# Patient Record
Sex: MALE | Race: WHITE | NOT HISPANIC OR LATINO | Employment: OTHER | URBAN - METROPOLITAN AREA
[De-identification: names, ages, dates, MRNs, and addresses within clinical notes are randomized per-mention and may not be internally consistent; named-entity substitution may affect disease eponyms.]

---

## 2023-09-01 PROBLEM — R00.2 PALPITATIONS: Status: ACTIVE | Noted: 2023-09-01

## 2023-09-01 PROBLEM — I42.9 CARDIOMYOPATHY (MULTI): Status: ACTIVE | Noted: 2023-09-01

## 2023-09-01 PROBLEM — R31.9 BLOOD IN URINE: Status: ACTIVE | Noted: 2023-09-01

## 2023-09-01 PROBLEM — J47.0 BRONCHIECTASIS WITH ACUTE LOWER RESPIRATORY INFECTION (MULTI): Status: ACTIVE | Noted: 2023-09-01
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
## 2023-09-01 PROBLEM — I49.1 ATRIAL PREMATURE COMPLEX: Status: ACTIVE | Noted: 2023-09-01

## 2023-09-01 PROBLEM — D64.9 ANEMIA: Status: ACTIVE | Noted: 2023-09-01

## 2023-09-01 PROBLEM — I49.8 ATRIAL ARRHYTHMIA: Status: ACTIVE | Noted: 2023-09-01

## 2023-09-01 PROBLEM — E03.9 HYPOTHYROIDISM: Status: ACTIVE | Noted: 2023-09-01

## 2023-09-01 PROBLEM — I47.10 SUPRAVENTRICULAR TACHYCARDIA (CMS-HCC): Status: ACTIVE | Noted: 2023-09-01

## 2023-09-01 PROBLEM — R31.0 FRANK HEMATURIA: Status: ACTIVE | Noted: 2023-09-01

## 2023-09-01 PROBLEM — E11.65 HYPERGLYCEMIA DUE TO TYPE 2 DIABETES MELLITUS (MULTI): Status: ACTIVE | Noted: 2023-09-01

## 2023-09-01 PROBLEM — I49.3 VENTRICULAR PREMATURE DEPOLARIZATION: Status: ACTIVE | Noted: 2023-09-01

## 2023-09-01 PROBLEM — I49.9 VENTRICULAR ARRHYTHMIA: Status: ACTIVE | Noted: 2023-09-01

## 2023-09-01 PROBLEM — I47.20 VENTRICULAR TACHYCARDIA (MULTI): Status: ACTIVE | Noted: 2023-09-01

## 2023-09-01 PROBLEM — I49.1 SUPRAVENTRICULAR PREMATURE BEATS: Status: ACTIVE | Noted: 2023-09-01

## 2023-09-01 PROBLEM — R31.9 HEMATURIA SYNDROME: Status: ACTIVE | Noted: 2023-09-01

## 2023-09-01 PROBLEM — R93.89 ABNORMAL CHEST CT: Status: ACTIVE | Noted: 2023-09-01

## 2023-09-01 PROBLEM — I10 HYPERTENSION: Status: ACTIVE | Noted: 2023-09-01

## 2023-09-01 RX ORDER — CHOLECALCIFEROL (VITAMIN D3) 50 MCG
2000 TABLET ORAL DAILY
COMMUNITY

## 2023-09-01 RX ORDER — CARVEDILOL 3.12 MG/1
3.12 TABLET ORAL
COMMUNITY
Start: 2020-03-13 | End: 2023-12-18

## 2023-09-01 RX ORDER — ALBUTEROL SULFATE 0.83 MG/ML
2.5 SOLUTION RESPIRATORY (INHALATION) 2 TIMES DAILY
COMMUNITY
End: 2023-12-04 | Stop reason: SDUPTHER

## 2023-09-01 RX ORDER — LEVOTHYROXINE SODIUM 50 UG/1
TABLET ORAL
COMMUNITY
End: 2023-12-04 | Stop reason: SDUPTHER

## 2023-09-01 RX ORDER — EPINEPHRINE 0.3 MG/.3ML
INJECTION INTRAMUSCULAR
COMMUNITY
End: 2023-10-23 | Stop reason: SDUPTHER

## 2023-09-01 RX ORDER — ASPIRIN 81 MG/1
81 TABLET ORAL DAILY
COMMUNITY
End: 2024-01-30 | Stop reason: ALTCHOICE

## 2023-09-01 RX ORDER — VITAMIN A 3000 MCG
2 CAPSULE ORAL EVERY 2 HOUR PRN
COMMUNITY
Start: 2012-02-20

## 2023-10-21 ENCOUNTER — HOSPITAL ENCOUNTER (EMERGENCY)
Facility: HOSPITAL | Age: 83
Discharge: HOME | End: 2023-10-21
Attending: EMERGENCY MEDICINE
Payer: MEDICARE

## 2023-10-21 VITALS
DIASTOLIC BLOOD PRESSURE: 74 MMHG | RESPIRATION RATE: 16 BRPM | HEART RATE: 91 BPM | HEIGHT: 72 IN | SYSTOLIC BLOOD PRESSURE: 138 MMHG | OXYGEN SATURATION: 98 % | TEMPERATURE: 98.4 F | WEIGHT: 166.45 LBS | BODY MASS INDEX: 22.54 KG/M2

## 2023-10-21 DIAGNOSIS — T78.40XA ALLERGIC REACTION, INITIAL ENCOUNTER: ICD-10-CM

## 2023-10-21 DIAGNOSIS — R22.0 SWELLING OF BOTH LIPS: Primary | ICD-10-CM

## 2023-10-21 PROCEDURE — 2500000001 HC RX 250 WO HCPCS SELF ADMINISTERED DRUGS (ALT 637 FOR MEDICARE OP): Performed by: EMERGENCY MEDICINE

## 2023-10-21 PROCEDURE — 99283 EMERGENCY DEPT VISIT LOW MDM: CPT | Performed by: EMERGENCY MEDICINE

## 2023-10-21 PROCEDURE — 2500000004 HC RX 250 GENERAL PHARMACY W/ HCPCS (ALT 636 FOR OP/ED): Performed by: EMERGENCY MEDICINE

## 2023-10-21 RX ORDER — FAMOTIDINE 20 MG/1
20 TABLET, FILM COATED ORAL ONCE
Status: COMPLETED | OUTPATIENT
Start: 2023-10-21 | End: 2023-10-21

## 2023-10-21 RX ORDER — LORATADINE 10 MG/1
10 TABLET ORAL DAILY
Status: DISCONTINUED | OUTPATIENT
Start: 2023-10-21 | End: 2023-10-21 | Stop reason: HOSPADM

## 2023-10-21 RX ORDER — EPINEPHRINE 0.3 MG/.3ML
1 INJECTION SUBCUTANEOUS ONCE AS NEEDED
Qty: 0.3 ML | Refills: 0 | Status: SHIPPED | OUTPATIENT
Start: 2023-10-21

## 2023-10-21 RX ADMIN — FAMOTIDINE 20 MG: 20 TABLET ORAL at 14:45

## 2023-10-21 RX ADMIN — LORATADINE 10 MG: 10 TABLET ORAL at 14:44

## 2023-10-21 RX ADMIN — DEXAMETHASONE 10 MG: 6 TABLET ORAL at 14:44

## 2023-10-21 ASSESSMENT — PAIN SCALES - GENERAL: PAINLEVEL_OUTOF10: 0 - NO PAIN

## 2023-10-21 ASSESSMENT — PAIN DESCRIPTION - PROGRESSION: CLINICAL_PROGRESSION: NOT CHANGED

## 2023-10-21 ASSESSMENT — COLUMBIA-SUICIDE SEVERITY RATING SCALE - C-SSRS
1. IN THE PAST MONTH, HAVE YOU WISHED YOU WERE DEAD OR WISHED YOU COULD GO TO SLEEP AND NOT WAKE UP?: NO
2. HAVE YOU ACTUALLY HAD ANY THOUGHTS OF KILLING YOURSELF?: NO
6. HAVE YOU EVER DONE ANYTHING, STARTED TO DO ANYTHING, OR PREPARED TO DO ANYTHING TO END YOUR LIFE?: NO

## 2023-10-21 ASSESSMENT — PAIN - FUNCTIONAL ASSESSMENT: PAIN_FUNCTIONAL_ASSESSMENT: 0-10

## 2023-10-21 NOTE — ED PROVIDER NOTES
EMERGENCY DEPARTMENT ENCOUNTER      [ ] CODE STEMI [ ] CODE Neuro [ ] CODE Yellow [ ] Modified Trauma [ ] CODE Blue      CHIEF COMPLAINT      Chief Complaint   Patient presents with    Allergic Reaction     I noticed my lips swelling and numbness  in my lips       Mode of Arrival:Car  Primary Care Provider: Kiran John MD  Medical Record Number: [id number]      History obtained by: Patient  Limited by nothing  Time seen: 3:53 PM    QUALITY MEASURES   PPE Utilized: N95 with goggles and gloves      HPI      Héctor Mariano is a 83 y.o. male with a history of levothyroxine use for hypothyroidism, hypertension and congestive heart failure on Coreg, well managed, not ACE inhibitor or ARB, known history of insect bites that had led to lip swelling in the past and has EpiPen at home, states that he woke up this morning feeling fine but then noticed slow onset of upper and lower lip swelling.  Does not recall insect bite overnight denies family history of hereditary angioedema.  Has not had any fever chills throat swelling or trouble breathing, sore throat, chest pain or swelling elsewhere in the body.  Did not notice a rash.  Denies any pruritus or pain.  Only notices the swelling in the lips.  Did not notice any open sores or wounds in the mouth or on the lips itself.  Did not take any did take Benadryl last night but more so for a rhinorrhea that he had yesterday.  Denies any injury to the area.  States again that he has not taken lisinopril for years stopped taking it about 3 years ago.  Never had this reaction to lisinopril only states that he had this reaction in the past with bugs and insect bites.  Denies any recent travel or new creams, detergents soaps clothing or new foods eaten.  No other complaints.      Patient otherwise denies fever, chills, n/v/d, chest pain, shortness of breath, sore throat, cough, rhinorrhea, abdominal pain, dysuria, hematuria, hematemesis, hematochezia, melena or any other accompanying  symptoms of late.      The patient has no other complaints at this time.               PAST MEDICAL HISTORY    No past medical history on file.      I have personally reviewed the patient's past medical history in the records.  Shayne Leon MD    SURGICAL HISTORY    No past surgical history on file.    I have personally reviewed the patient's past surgical history in the records.  Shayne Leon MD    CURRENT MEDICATIONS    I have reviewed the patient’s medications.   Please see nursing and pharmacy records for the most up to date list.     [unfilled]    ALLERGIES    Allergies   Allergen Reactions    Bee Venom Protein (Honey Bee) Swelling       I have personally reviewed the patient's past history of allergies in the records.  Shayne Leon MD    FAMILY HISTORY    Family History   Problem Relation Name Age of Onset    Heart disease Father      Ovarian cancer Paternal Grandmother      Diabetes Paternal Grandfather      Prostate cancer Paternal Grandfather         I have personally reviewed the patient's family history in the records.  Shayne Leon MD    SOCIAL HISTORY    Social History     Socioeconomic History    Marital status: Unknown     Spouse name: Not on file    Number of children: Not on file    Years of education: Not on file    Highest education level: Not on file   Occupational History    Not on file   Tobacco Use    Smoking status: Not on file    Smokeless tobacco: Not on file   Substance and Sexual Activity    Alcohol use: Not on file    Drug use: Not on file    Sexual activity: Not on file   Other Topics Concern    Not on file   Social History Narrative    Not on file     Social Determinants of Health     Financial Resource Strain: Not on file   Food Insecurity: Not on file   Transportation Needs: Not on file   Physical Activity: Not on file   Stress: Not on file   Social Connections: Not on file   Intimate Partner Violence: Not on file   Housing Stability: Not on file         I have personally reviewed the  patient's social history in the records.  Shayne Leon MD    REVIEW OF SYSTEMS      14 point ROS was reviewed and negative except as noted above in HPI.      PHYSICAL EXAM    VITAL SIGNS:    BP (!) 157/92 (Patient Position: Sitting)   Pulse 99   Temp 36.9 °C (98.4 °F) (Temporal)   Resp 18   Ht 1.829 m (6')   Wt 75.5 kg (166 lb 7.2 oz)   SpO2 99%   BMI 22.57 kg/m²    Review EMR for vital signs  Nursing note and vitals reviewed.    Constitutional:  Alert and oriented, well-developed, well-nourished, appears stated age, non-toxic appearing  HENT:  Normocephalic, atraumatic, bilateral external ears normal, oropharynx moist, Nose normal.  Posterior pharynx within normal limits.  Slight swelling of the upper and lower lip noted confirmed by wife with no open wounds.  No ulceration noted intraorally.  Neck: normal range of motion, no tenderness, supple, no stridor.  Eyes:  PERRL, EOMI, conjunctiva normal, no discharge.   Cardiovascular:  Normal heart rate, normal rhythm, no murmurs, no rubs, no gallops.   Respiratory:  Normal breath sounds, no respiratory distress, no wheezing, no chest wall tenderness.   GI:  Bowel sounds normal, soft, no tenderness, no rebound or guarding, no distention, no masses pulsatile or otherwise   (any female  exam was done with female chaperone present):   Deferred  Integument:  Warm, dry, no erythema, no rash, no edema.   Back:  No midline tenderness, no CVA tenderness.   Musculoskeletal:  Intact distal pulses, no tenderness, no cyanosis, no clubbing, with capillary refill less than 2 seconds. Good range of motion in all major joints. No tenderness to palpation or major deformities noted.    Neurologic:  Alert & oriented x 3, normal motor function, normal sensory function, no focal deficits noted. Cranial nerves II-XII intact.  Psychiatric:  Affect normal, judgment normal, mood normal.     EKG  None           Reviewed and interpreted by me, Shayne Leon MD       RADIOLOGY  No orders to  display       All Imaging studies evaluated and interpreted by ED physician except when noted otherwise.    ED PROVIDER INTERPRETATION (XRAYS ONLY):       *I have interpreted the x-ray real-time in the ED myself, and made a clinical decision on it prior to the formal radiology reading.    Shayne Leon M.D.    RADIOLOGIST IMPRESSION (U/S, CT, MRI):   No orders to display         PERTINENT LABS    Please refer to the chart for all lab work and to MDM for relevant discussion.      PROCEDURE    None (procdoc)    ED COURSE & MEDICAL DECISION MAKING    Pertinent Labs & Imaging studies reviewed. (See chart for details)    MDM:    Assessment: Héctor Mariano is a 83 y.o.male who presents to the ED with possible angioedema or allergic reaction, reviewed [employer] encyclopedia and read that there has been very rare occasions of angioedema from Coreg but my suspicion is very low for this suspect that perhaps patient had an insect bite overnight instead that led to this reaction but does not appear to be an airway compromise with vital signs stable and told patient I would give him Decadron, Claritin and Pepcid and would observe him for the next hour and if there is no worsening or even a slight improvement, will consider discharge with instructions to follow-up with cardiologist regarding heart rate.  Patient understands and agrees with plan        Prior records in EPIC reviewed by me.     2023 Coding Requirements:  --Independent historian(s):    see HPI  --Review of prior records:    EHR reviewed   --Relevant comorbidities:    see records  --Social determinants of health:        I have considered the following diagnoses for this patients allergic reaction  Allergic reaction, angioedema, respiratory distress, pneumonia, PE, asthma, COPD, urticaria, anaphylaxis, PE, carcinoid tumors, alcohol-induced flush, autoimmune disorders, thyroid disorders, epiglottitis, cellulitis, or infectious airway swelling, adverse   meds  reaction.      Both wife and myself noticed visible improvement after about an hour after receiving Decadron Claritin Pepcid and after discussion with patient stating that his last EpiPen was about 8 years old, agreed to a repeat prescription and will obtain over-the-counter Claritin and Pepcid but given strict return precautions if he develops any trouble breathing or throat swelling for which she should take an EpiPen and come back to the ED.  Otherwise will follow-up with primary care doctor for possible referral to allergist.  Patient and wife understand and agree with this plan.          ED VITALS  Vitals:    10/21/23 1428   BP: (!) 157/92   Patient Position: Sitting   Pulse: 99   Resp: 18   Temp: 36.9 °C (98.4 °F)   TempSrc: Temporal   SpO2: 99%   Weight: 75.5 kg (166 lb 7.2 oz)   Height: 1.829 m (6')       BP  Min: 157/92  Max: 157/92    As part of the 2022 [facility] reporting requirements, the following measures have been reviewed and documented:    None     1. Swelling of both lips    2. Allergic reaction, initial encounter          DISPOSITION       DISCHARGE.  The patient is discharged back to their place of residence.  Discharge diagnosis, instructions and plan were discussed and understood. At the time of discharge the patient was comfortable and was in no apparent distress. Patient is aware of diagnostic uncertainty and was notified though testing is negative here, there is a very small chance that pathology may be missed.  The patient understands these risks and the patient /family understood to return immediately to the emergency department if the symptoms worsen or if they have any additional concerns.    DISCHARGE MEDICATIONS  New Prescriptions    No medications on file       FOLLOW UP  No follow-up provider specified.    Shayne Leon    This note was created with the assistance of voice recognition technology.  While attempts were made to ensure accuracy, mis-transcription may be present due to  limitations in the software.        Electronically signed by MD Shayne Mao MD  10/21/23 1030

## 2023-10-21 NOTE — DISCHARGE INSTRUCTIONS
Observe carefully and if you notice any trouble breathing or worsening swelling you may use the EpiPen and come back to the emergency room for repeat evaluation.  You can also use over-the-counter Claritin and Pepcid.  Make sure you have these readily available at home as well.  Consider follow-up with your primary care doctor for further recommendations and possible referral to allergist

## 2023-10-23 ENCOUNTER — OFFICE VISIT (OUTPATIENT)
Dept: PRIMARY CARE | Facility: CLINIC | Age: 83
End: 2023-10-23
Payer: MEDICARE

## 2023-10-23 VITALS
OXYGEN SATURATION: 98 % | BODY MASS INDEX: 22.65 KG/M2 | DIASTOLIC BLOOD PRESSURE: 60 MMHG | SYSTOLIC BLOOD PRESSURE: 138 MMHG | HEART RATE: 86 BPM | TEMPERATURE: 97.7 F | WEIGHT: 167 LBS

## 2023-10-23 DIAGNOSIS — L50.9 URTICARIA: Primary | ICD-10-CM

## 2023-10-23 DIAGNOSIS — T78.40XA ALLERGIC REACTION, INITIAL ENCOUNTER: ICD-10-CM

## 2023-10-23 PROCEDURE — 99213 OFFICE O/P EST LOW 20 MIN: CPT | Performed by: INTERNAL MEDICINE

## 2023-10-23 PROCEDURE — 3078F DIAST BP <80 MM HG: CPT | Performed by: INTERNAL MEDICINE

## 2023-10-23 PROCEDURE — 3075F SYST BP GE 130 - 139MM HG: CPT | Performed by: INTERNAL MEDICINE

## 2023-10-23 PROCEDURE — 1036F TOBACCO NON-USER: CPT | Performed by: INTERNAL MEDICINE

## 2023-10-23 PROCEDURE — 1126F AMNT PAIN NOTED NONE PRSNT: CPT | Performed by: INTERNAL MEDICINE

## 2023-10-23 PROCEDURE — 1159F MED LIST DOCD IN RCRD: CPT | Performed by: INTERNAL MEDICINE

## 2023-10-23 RX ORDER — METHYLPREDNISOLONE 4 MG/1
TABLET ORAL
Qty: 21 TABLET | Refills: 0 | Status: SHIPPED | OUTPATIENT
Start: 2023-10-23 | End: 2023-10-30

## 2023-10-23 RX ORDER — LORATADINE 10 MG/1
10 TABLET ORAL DAILY
Qty: 30 TABLET | Refills: 2
Start: 2023-10-23 | End: 2023-12-04 | Stop reason: ALTCHOICE

## 2023-10-23 RX ORDER — FAMOTIDINE 20 MG/1
20 TABLET, FILM COATED ORAL 2 TIMES DAILY
Qty: 60 TABLET | Refills: 5
Start: 2023-10-23 | End: 2023-12-04 | Stop reason: ALTCHOICE

## 2023-10-23 ASSESSMENT — PATIENT HEALTH QUESTIONNAIRE - PHQ9
2. FEELING DOWN, DEPRESSED OR HOPELESS: NOT AT ALL
1. LITTLE INTEREST OR PLEASURE IN DOING THINGS: NOT AT ALL
SUM OF ALL RESPONSES TO PHQ9 QUESTIONS 1 AND 2: 0

## 2023-10-23 ASSESSMENT — ENCOUNTER SYMPTOMS
OCCASIONAL FEELINGS OF UNSTEADINESS: 0
LOSS OF SENSATION IN FEET: 0
DEPRESSION: 0

## 2023-10-23 ASSESSMENT — PAIN SCALES - GENERAL: PAINLEVEL: 0-NO PAIN

## 2023-10-23 NOTE — PROGRESS NOTES
[facility]: [facility]  PROGRESS NOTE      Héctor Mariano is a 83 y.o. male that is presenting today for Follow-up (Hospital follow up ).    Assessment/Plan   Diagnoses and all orders for this visit:  Urticaria  -     methylPREDNISolone (Medrol Dospak) 4 mg tablets; Take as directed on package.  -     loratadine (Claritin) 10 mg tablet; Take 1 tablet (10 mg) by mouth once daily.  Allergic reaction, initial encounter  -     methylPREDNISolone (Medrol Dospak) 4 mg tablets; Take as directed on package.  -     loratadine (Claritin) 10 mg tablet; Take 1 tablet (10 mg) by mouth once daily.  -     famotidine (Pepcid) 20 mg tablet; Take 1 tablet (20 mg) by mouth 2 times a day.  Subjective   Patient is here for FUV after seen in the ED for swollen lips on 10/21/2023, was not an angioedema type of allergic reaction was given Decadron, Pepcid and Claritin and the swelling improved while he was still in the ED.  Yesterday he noted large itchy hives all over his body took benadryl with no help. Denies exposure to any new products or food, no new meds.   Denies any SOB, wheezing or throat tightness lips still swollen but not as it was - improving. Denies any insect bites anywhere on his body.      Review of Systems   All pertinent POSITIVES as noted per HPI.  All other systems have been reviewed and are NEGATIVE and /or Noncontributory to this patient current visit or complaint.    Objective   There were no vitals filed for this visit.   There is no height or weight on file to calculate BMI.  Physical Exam  Vitals and nursing note reviewed.   Constitutional:       Appearance: Normal appearance.   HENT:      Head: Normocephalic and atraumatic.   Neck:      Vascular: No carotid bruit.   Cardiovascular:      Rate and Rhythm: Normal rate and regular rhythm.      Pulses: Normal pulses.      Heart sounds: Normal heart sounds.   Pulmonary:      Effort: Pulmonary effort is normal.      Breath sounds: Normal breath  "sounds.   Musculoskeletal:      Cervical back: Neck supple.   Lymphadenopathy:      Cervical: No cervical adenopathy.   Skin:     General: Skin is warm and dry.      Findings: Erythema and rash present.      Comments: Large dry erythematous itchy patches spread all over his chest  some in the lower back/ around the waist line as well as the upper and lowe extremities.   Neurological:      Mental Status: He is alert.   Psychiatric:         Mood and Affect: Mood normal.         Behavior: Behavior normal.       Diagnostic Results   Lab Results   Component Value Date    GLUCOSE 118 (H) 02/02/2023    CALCIUM 10.0 02/02/2023     02/02/2023    K 4.5 02/02/2023    CO2 27 02/02/2023    CL 95 (L) 02/02/2023    BUN 26 (H) 02/02/2023    CREATININE 1.3 02/02/2023     Lab Results   Component Value Date    ALT 17 02/02/2023    AST 28 02/02/2023    ALKPHOS 100 02/02/2023    BILITOT 0.6 02/02/2023     Lab Results   Component Value Date    WBC 18.3 (H) 02/02/2023    HGB 14.4 02/02/2023    HCT 42.1 02/02/2023    MCV 87.5 02/02/2023     02/02/2023     Lab Results   Component Value Date    CHOL 173 10/05/2022    CHOL 200 09/10/2021    CHOL 188 08/28/2020     Lab Results   Component Value Date    HDL 39 (L) 10/05/2022    HDL 50 09/10/2021    HDL 45 08/28/2020     Lab Results   Component Value Date    LDLCALC 113 10/05/2022    LDLCALC 130 09/10/2021    LDLCALC 126 08/28/2020     Lab Results   Component Value Date    TRIG 104 10/05/2022    TRIG 101 09/10/2021    TRIG 87 08/28/2020     No components found for: \"CHOLHDL\"  No results found for: \"HGBA1C\"  Other labs not included in the list above were reviewed either before or during this encounter.    History    No past medical history on file.  No past surgical history on file.  Family History   Problem Relation Name Age of Onset    Heart disease Father      Ovarian cancer Paternal Grandmother      Diabetes Paternal Grandfather      Prostate cancer Paternal Grandfather   "     Social History     Socioeconomic History    Marital status:      Spouse name: Not on file    Number of children: Not on file    Years of education: Not on file    Highest education level: Not on file   Occupational History    Not on file   Tobacco Use    Smoking status: Not on file    Smokeless tobacco: Not on file   Substance and Sexual Activity    Alcohol use: Not on file    Drug use: Not on file    Sexual activity: Not on file   Other Topics Concern    Not on file   Social History Narrative    Not on file     Social Determinants of Health     Financial Resource Strain: Not on file   Food Insecurity: Not on file   Transportation Needs: Not on file   Physical Activity: Not on file   Stress: Not on file   Social Connections: Not on file   Intimate Partner Violence: Not on file   Housing Stability: Not on file     Allergies   Allergen Reactions    Bee Venom Protein (Honey Bee) Swelling     Current Outpatient Medications on File Prior to Visit   Medication Sig Dispense Refill    albuterol 2.5 mg /3 mL (0.083 %) nebulizer solution Take 3 mL (2.5 mg) by nebulization 2 times a day.      aspirin (Adult Low Dose Aspirin) 81 mg EC tablet Take 1 tablet (81 mg) by mouth once daily.      calcium carbonate (TUMS ORAL) Tums      carvedilol (Coreg) 3.125 mg tablet Take 1 tablet (3.125 mg) by mouth 2 times a day with meals.      cholecalciferol (Vitamin D-3) 50 MCG (2000 UT) tablet Take 1 tablet (2,000 Units) by mouth once daily.      EPINEPHrine (EpiPen) 0.3 mg/0.3 mL injection syringe as directed Injection      EPINEPHrine (Epipen) 0.3 mg/0.3 mL injection syringe Inject 0.3 mL (0.3 mg) into the muscle 1 time if needed for anaphylaxis for up to 1 dose. Inject into upper leg. Call 911 after use. 0.3 mL 0    levothyroxine (Synthroid, Levoxyl) 50 mcg tablet 1 tablet every morning with 2 tablets on sunday on an empty stomach Orally Once a day for 90 days      MULTIVITAMIN ORAL as directed Orally      psyllium husk  (METAMUCIL ORAL) Metamucil      sodium chloride (Saline NasaL) 0.65 % nasal spray Administer 2 sprays into each nostril every 2 hours if needed.       No current facility-administered medications on file prior to visit.     Immunization History   Administered Date(s) Administered    Flu vaccine, quadrivalent, high-dose, preservative free, age 65y+ (FLUZONE) 10/06/2020, 09/20/2021, 10/04/2022    Influenza, High Dose Seasonal, Preservative Free 10/06/2016, 10/12/2017, 10/25/2018, 10/15/2019    Influenza, seasonal, injectable 09/30/2010, 09/29/2011, 09/27/2012, 10/03/2013, 10/09/2014, 10/01/2015    Novel influenza-H1N1-09, preservative-free 12/13/2009    Pfizer COVID-19 vaccine, bivalent, age 12 years and older (30 mcg/0.3 mL) 10/14/2022    Pfizer Gray Cap SARS-CoV-2 04/17/2022    Pfizer Purple Cap SARS-CoV-2 01/20/2021, 02/11/2021, 09/25/2021    Pneumococcal conjugate vaccine, 13-valent (PREVNAR 13) 09/29/2007, 10/11/2012, 11/01/2016    Pneumococcal polysaccharide vaccine, 23-valent, age 2 years and older (PNEUMOVAX 23) 10/11/2012, 11/12/2020    Zoster, live 08/23/2016     Patient's medical history was reviewed and updated either before or during this encounter.    Kiran John MD

## 2023-10-23 NOTE — PATIENT INSTRUCTIONS
Start the Clarirtin on Saturday and take with the last medrol dose and continue to take x 10days to 2 weeks   Call if any recurrence otherwise will see you for your Dec appt.

## 2023-11-13 ENCOUNTER — TELEPHONE (OUTPATIENT)
Dept: PRIMARY CARE | Facility: CLINIC | Age: 83
End: 2023-11-13
Payer: MEDICARE

## 2023-11-13 DIAGNOSIS — T78.40XD ALLERGY, SUBSEQUENT ENCOUNTER: ICD-10-CM

## 2023-11-13 NOTE — TELEPHONE ENCOUNTER
Pt states he completed the meds you gave him for allergies and would like to proceed as advised to see an allergist.  Asking who you recommend and referral.  Please advise.  Ph: [phone]

## 2023-11-20 ENCOUNTER — TELEPHONE (OUTPATIENT)
Dept: PRIMARY CARE | Facility: CLINIC | Age: 83
End: 2023-11-20
Payer: MEDICARE

## 2023-11-20 NOTE — TELEPHONE ENCOUNTER
Pt states she called Dr. Mendieta to schedule appt but cannot get in until Feb 2024.  Asking if there is another allergist you could recommend.  Please advise.  Ph:  [phone]

## 2023-11-20 NOTE — TELEPHONE ENCOUNTER
Gave pt contact information.  Pt will call office back if he opts to go with them so can put in new referral and cancel referral to Dr. Mendieta.

## 2023-11-21 ENCOUNTER — LAB (OUTPATIENT)
Dept: LAB | Facility: LAB | Age: 83
End: 2023-11-21
Payer: MEDICARE

## 2023-11-21 DIAGNOSIS — R39.9 UNSPECIFIED SYMPTOMS AND SIGNS INVOLVING THE GENITOURINARY SYSTEM: ICD-10-CM

## 2023-11-21 DIAGNOSIS — E55.9 VITAMIN D DEFICIENCY, UNSPECIFIED: ICD-10-CM

## 2023-11-21 DIAGNOSIS — Z01.89 ENCOUNTER FOR OTHER SPECIFIED SPECIAL EXAMINATIONS: Primary | ICD-10-CM

## 2023-11-21 DIAGNOSIS — R73.9 HYPERGLYCEMIA, UNSPECIFIED: ICD-10-CM

## 2023-11-21 DIAGNOSIS — Z12.5 ENCOUNTER FOR SCREENING FOR MALIGNANT NEOPLASM OF PROSTATE: ICD-10-CM

## 2023-11-21 DIAGNOSIS — E78.00 PURE HYPERCHOLESTEROLEMIA, UNSPECIFIED: ICD-10-CM

## 2023-11-21 DIAGNOSIS — E03.9 HYPOTHYROIDISM, UNSPECIFIED: ICD-10-CM

## 2023-11-21 LAB
25(OH)D3 SERPL-MCNC: 54 NG/ML (ref 31–100)
ALBUMIN SERPL-MCNC: 4 G/DL (ref 3.5–5)
ALP BLD-CCNC: 109 U/L (ref 35–125)
ALT SERPL-CCNC: 13 U/L (ref 5–40)
ANION GAP SERPL CALC-SCNC: 13 MMOL/L
AST SERPL-CCNC: 18 U/L (ref 5–40)
BASOPHILS # BLD AUTO: 0.04 X10*3/UL (ref 0–0.1)
BASOPHILS NFR BLD AUTO: 0.5 %
BILIRUB DIRECT SERPL-MCNC: <0.2 MG/DL (ref 0–0.2)
BILIRUB SERPL-MCNC: 0.3 MG/DL (ref 0.1–1.2)
BUN SERPL-MCNC: 13 MG/DL (ref 8–25)
CALCIUM SERPL-MCNC: 9.2 MG/DL (ref 8.5–10.4)
CHLORIDE SERPL-SCNC: 99 MMOL/L (ref 97–107)
CHOLEST SERPL-MCNC: 220 MG/DL (ref 133–200)
CHOLEST/HDLC SERPL: 3.9 {RATIO}
CO2 SERPL-SCNC: 26 MMOL/L (ref 24–31)
CREAT SERPL-MCNC: 0.8 MG/DL (ref 0.4–1.6)
EOSINOPHIL # BLD AUTO: 0.11 X10*3/UL (ref 0–0.4)
EOSINOPHIL NFR BLD AUTO: 1.3 %
ERYTHROCYTE [DISTWIDTH] IN BLOOD BY AUTOMATED COUNT: 14.2 % (ref 11.5–14.5)
EST. AVERAGE GLUCOSE BLD GHB EST-MCNC: 117 MG/DL
GFR SERPL CREATININE-BSD FRML MDRD: 87 ML/MIN/1.73M*2
GLUCOSE SERPL-MCNC: 94 MG/DL (ref 65–99)
HBA1C MFR BLD: 5.7 %
HCT VFR BLD AUTO: 39.6 % (ref 41–52)
HDLC SERPL-MCNC: 56 MG/DL
HGB BLD-MCNC: 12.8 G/DL (ref 13.5–17.5)
IMM GRANULOCYTES # BLD AUTO: 0.02 X10*3/UL (ref 0–0.5)
IMM GRANULOCYTES NFR BLD AUTO: 0.2 % (ref 0–0.9)
LDLC SERPL CALC-MCNC: 141 MG/DL (ref 65–130)
LYMPHOCYTES # BLD AUTO: 3.35 X10*3/UL (ref 0.8–3)
LYMPHOCYTES NFR BLD AUTO: 41 %
MCH RBC QN AUTO: 28.8 PG (ref 26–34)
MCHC RBC AUTO-ENTMCNC: 32.3 G/DL (ref 32–36)
MCV RBC AUTO: 89 FL (ref 80–100)
MONOCYTES # BLD AUTO: 0.63 X10*3/UL (ref 0.05–0.8)
MONOCYTES NFR BLD AUTO: 7.7 %
NEUTROPHILS # BLD AUTO: 4.02 X10*3/UL (ref 1.6–5.5)
NEUTROPHILS NFR BLD AUTO: 49.3 %
NRBC BLD-RTO: 0 /100 WBCS (ref 0–0)
PLATELET # BLD AUTO: 366 X10*3/UL (ref 150–450)
POTASSIUM SERPL-SCNC: 4.8 MMOL/L (ref 3.4–5.1)
PROT SERPL-MCNC: 6.7 G/DL (ref 5.9–7.9)
PSA SERPL-MCNC: 0.8 NG/ML
RBC # BLD AUTO: 4.44 X10*6/UL (ref 4.5–5.9)
RBC #/AREA URNS AUTO: NORMAL /HPF
SODIUM SERPL-SCNC: 138 MMOL/L (ref 133–145)
TRIGL SERPL-MCNC: 113 MG/DL (ref 40–150)
TSH SERPL DL<=0.05 MIU/L-ACNC: 4.05 MIU/L (ref 0.27–4.2)
WBC # BLD AUTO: 8.2 X10*3/UL (ref 4.4–11.3)
WBC #/AREA URNS AUTO: NORMAL /HPF

## 2023-11-21 PROCEDURE — 80061 LIPID PANEL: CPT

## 2023-11-21 PROCEDURE — 80053 COMPREHEN METABOLIC PANEL: CPT

## 2023-11-21 PROCEDURE — 36415 COLL VENOUS BLD VENIPUNCTURE: CPT

## 2023-11-21 PROCEDURE — 81001 URINALYSIS AUTO W/SCOPE: CPT

## 2023-11-21 PROCEDURE — 85025 COMPLETE CBC W/AUTO DIFF WBC: CPT

## 2023-11-21 PROCEDURE — 83036 HEMOGLOBIN GLYCOSYLATED A1C: CPT

## 2023-11-21 PROCEDURE — 82248 BILIRUBIN DIRECT: CPT

## 2023-11-21 PROCEDURE — 84443 ASSAY THYROID STIM HORMONE: CPT

## 2023-11-21 PROCEDURE — G0103 PSA SCREENING: HCPCS

## 2023-11-21 PROCEDURE — 82306 VITAMIN D 25 HYDROXY: CPT

## 2023-12-04 ENCOUNTER — OFFICE VISIT (OUTPATIENT)
Dept: PRIMARY CARE | Facility: CLINIC | Age: 83
End: 2023-12-04
Payer: MEDICARE

## 2023-12-04 VITALS
TEMPERATURE: 97.8 F | WEIGHT: 169 LBS | OXYGEN SATURATION: 99 % | DIASTOLIC BLOOD PRESSURE: 80 MMHG | BODY MASS INDEX: 22.89 KG/M2 | HEART RATE: 86 BPM | SYSTOLIC BLOOD PRESSURE: 140 MMHG | HEIGHT: 72 IN

## 2023-12-04 DIAGNOSIS — J47.0 BRONCHIECTASIS WITH ACUTE LOWER RESPIRATORY INFECTION (MULTI): ICD-10-CM

## 2023-12-04 DIAGNOSIS — I25.5 ISCHEMIC CARDIOMYOPATHY: ICD-10-CM

## 2023-12-04 DIAGNOSIS — I47.10 SUPRAVENTRICULAR TACHYCARDIA (CMS-HCC): ICD-10-CM

## 2023-12-04 DIAGNOSIS — I10 PRIMARY HYPERTENSION: ICD-10-CM

## 2023-12-04 DIAGNOSIS — Z00.00 ENCOUNTER FOR MEDICARE ANNUAL WELLNESS EXAM: Primary | ICD-10-CM

## 2023-12-04 DIAGNOSIS — E03.9 ACQUIRED HYPOTHYROIDISM: ICD-10-CM

## 2023-12-04 PROBLEM — R31.9 HEMATURIA SYNDROME: Status: RESOLVED | Noted: 2023-09-01 | Resolved: 2023-12-04

## 2023-12-04 PROBLEM — I47.20 VENTRICULAR TACHYCARDIA (MULTI): Status: RESOLVED | Noted: 2023-09-01 | Resolved: 2023-12-04

## 2023-12-04 PROBLEM — E11.65 HYPERGLYCEMIA DUE TO TYPE 2 DIABETES MELLITUS (MULTI): Status: RESOLVED | Noted: 2023-09-01 | Resolved: 2023-12-04

## 2023-12-04 PROCEDURE — 1126F AMNT PAIN NOTED NONE PRSNT: CPT | Performed by: INTERNAL MEDICINE

## 2023-12-04 PROCEDURE — 1036F TOBACCO NON-USER: CPT | Performed by: INTERNAL MEDICINE

## 2023-12-04 PROCEDURE — 3079F DIAST BP 80-89 MM HG: CPT | Performed by: INTERNAL MEDICINE

## 2023-12-04 PROCEDURE — G0439 PPPS, SUBSEQ VISIT: HCPCS | Performed by: INTERNAL MEDICINE

## 2023-12-04 PROCEDURE — 1159F MED LIST DOCD IN RCRD: CPT | Performed by: INTERNAL MEDICINE

## 2023-12-04 PROCEDURE — 3077F SYST BP >= 140 MM HG: CPT | Performed by: INTERNAL MEDICINE

## 2023-12-04 RX ORDER — ALBUTEROL SULFATE 0.83 MG/ML
2.5 SOLUTION RESPIRATORY (INHALATION) 2 TIMES DAILY
Qty: 75 ML | Refills: 1 | Status: SHIPPED | OUTPATIENT
Start: 2023-12-04 | End: 2024-01-22 | Stop reason: SDUPTHER

## 2023-12-04 RX ORDER — LEVOTHYROXINE SODIUM 50 UG/1
TABLET ORAL
Qty: 90 TABLET | Refills: 1 | Status: SHIPPED | OUTPATIENT
Start: 2023-12-04 | End: 2024-06-04 | Stop reason: SDUPTHER

## 2023-12-04 ASSESSMENT — PAIN SCALES - GENERAL: PAINLEVEL: 0-NO PAIN

## 2023-12-04 ASSESSMENT — ENCOUNTER SYMPTOMS
OCCASIONAL FEELINGS OF UNSTEADINESS: 0
LOSS OF SENSATION IN FEET: 0
DEPRESSION: 0

## 2023-12-04 ASSESSMENT — PATIENT HEALTH QUESTIONNAIRE - PHQ9
1. LITTLE INTEREST OR PLEASURE IN DOING THINGS: NOT AT ALL
SUM OF ALL RESPONSES TO PHQ9 QUESTIONS 1 AND 2: 0
2. FEELING DOWN, DEPRESSED OR HOPELESS: NOT AT ALL

## 2023-12-04 NOTE — PROGRESS NOTES
[facility]: [facility]  MEDICARE WELLNESS EXAM      Héctor Mariano is a 83 y.o. male that is presenting today for Annual Exam.    Assessment/Plan    Diagnoses and all orders for this visit:  Encounter for Medicare annual wellness exam  Primary hypertension     - BP was Normal in the office toda  - No medication changes  recommended at this time  - Patient will monitor blood pressures at home ( once a week at different time of the day), and notify the office if gets frequent readings above 150/90 OR below 110/60  - Reinforced low salt diet (Dash diet) and exercise 30 Minutes 5 days a week .    - Refills by Cardiology   Acquired hypothyroidism  Per most recent BW well controlled  Continue current medication / Change medication  Rx Escripted 90 days x 1  -     levothyroxine (Synthroid, Levoxyl) 50 mcg tablet; 1 tablet every morning with 2 tablets on sunday on an empty stomach Orally Once a day for 90 days  Ischemic cardiomyopathy     Under control with current treatment   Continue the same / followed by cardiology  Supraventricular tachycardia      Under control with current treatment   Continue the same / followed by cardiology  Bronchiectasis without acute lower respiratory infection (CMS/HCC)       Fair control with current treatment   Continue the same   Rx E-scripted 90 days x 1  -     albuterol 2.5 mg /3 mL (0.083 %) nebulizer solution; Take 3 mL (2.5 mg) by nebulization 2 times a day.  Other orders  -     Follow Up In Primary Care; Future  ADVANCED CARE PLANNING  Advanced Care Planning was discussed with patient:  The patient has an active advanced care plan on file. The patient has an active surrogate decision-maker on file.  Encouraged the patient to confirm that Living Will and Healthcare Power of  (HCPoA) are accurate and up to date.  Encouraged the patient to confirm that our office be provided a copy of any documentation in the event that anything changes.    ACTIVITIES OF  DAILY LIVING  Basic ADLs:  Bathing: Independent, Dressing: Independent, Toileting: Independent, Transferring: Independent, Continence: Independent, Feeding: Independent.    Instrumental ADLs:  Ability to use phone: Independent, Shopping: Independent, Cooking: Independent, House-keeping: Independent, Laundry: Independent, Transportation: Independent, Medication Management: Independent, Finance Management: Independent.    Subjective   Patient is here for his  wellness, been doing well and has no concerns at this time.    Review of Systems  All pertinent POSITIVES as noted per HPI.  All other systems have been reviewed and are NEGATIVE and /or Noncontributory to this patient current visit or complaint.  Objective   Vitals:    12/04/23 0959   BP: 140/80   Pulse: 86   Temp: 36.6 °C (97.8 °F)   SpO2: 99%      Body mass index is 22.92 kg/m².  Physical Exam  Vitals and nursing note reviewed.   Constitutional:       Appearance: Normal appearance.   HENT:      Head: Normocephalic and atraumatic.      Right Ear: Tympanic membrane, ear canal and external ear normal.      Left Ear: Tympanic membrane, ear canal and external ear normal.      Nose: Nose normal.      Mouth/Throat:      Mouth: Mucous membranes are moist.      Pharynx: Oropharynx is clear.   Eyes:      Extraocular Movements: Extraocular movements intact.      Conjunctiva/sclera: Conjunctivae normal.      Pupils: Pupils are equal, round, and reactive to light.   Neck:      Vascular: No carotid bruit.   Cardiovascular:      Rate and Rhythm: Normal rate and regular rhythm.      Pulses: Normal pulses.      Heart sounds: Normal heart sounds.      Comments: Few Extrasystole  Pulmonary:      Effort: Pulmonary effort is normal.      Breath sounds: Normal breath sounds.   Abdominal:      General: Abdomen is flat. Bowel sounds are normal.      Palpations: Abdomen is soft.   Musculoskeletal:         General: No swelling. Normal range of motion.      Cervical back: Normal range  "of motion and neck supple.   Lymphadenopathy:      Cervical: No cervical adenopathy.   Skin:     General: Skin is warm and dry.   Neurological:      General: No focal deficit present.      Mental Status: He is alert and oriented to person, place, and time. Mental status is at baseline.   Psychiatric:         Mood and Affect: Mood normal.         Behavior: Behavior normal.     Diagnostic Results   Lab Results   Component Value Date    GLUCOSE 94 11/21/2023    CALCIUM 9.2 11/21/2023     11/21/2023    K 4.8 11/21/2023    CO2 26 11/21/2023    CL 99 11/21/2023    BUN 13 11/21/2023    CREATININE 0.80 11/21/2023     Lab Results   Component Value Date    ALT 13 11/21/2023    AST 18 11/21/2023    ALKPHOS 109 11/21/2023    BILITOT 0.3 11/21/2023     Lab Results   Component Value Date    WBC 8.2 11/21/2023    HGB 12.8 (L) 11/21/2023    HCT 39.6 (L) 11/21/2023    MCV 89 11/21/2023     11/21/2023     Lab Results   Component Value Date    CHOL 220 (H) 11/21/2023    CHOL 173 10/05/2022    CHOL 200 09/10/2021     Lab Results   Component Value Date    HDL 56.0 11/21/2023    HDL 39 (L) 10/05/2022    HDL 50 09/10/2021     Lab Results   Component Value Date    LDLCALC 141 (H) 11/21/2023    LDLCALC 113 10/05/2022    LDLCALC 130 09/10/2021     Lab Results   Component Value Date    TRIG 113 11/21/2023    TRIG 104 10/05/2022    TRIG 101 09/10/2021     No components found for: \"CHOLHDL\"  Lab Results   Component Value Date    HGBA1C 5.7 (H) 11/21/2023     Other labs not included in the list above reviewed either before or during this encounter.    History   History reviewed. No pertinent past medical history.  History reviewed. No pertinent surgical history.  Family History   Problem Relation Name Age of Onset    Heart disease Father      Ovarian cancer Paternal Grandmother      Diabetes Paternal Grandfather      Prostate cancer Paternal Grandfather       Social History     Socioeconomic History    Marital status:      " Spouse name: Not on file    Number of children: Not on file    Years of education: Not on file    Highest education level: Not on file   Occupational History    Not on file   Tobacco Use    Smoking status: Former     Types: Cigarettes     Passive exposure: Past    Smokeless tobacco: Never    Tobacco comments:     Pt quit about 55 years ago   Vaping Use    Vaping Use: Never used   Substance and Sexual Activity    Alcohol use: Yes     Comment: very rarely    Drug use: Never    Sexual activity: Not on file   Other Topics Concern    Not on file   Social History Narrative    Not on file     Social Determinants of Health     Financial Resource Strain: Not on file   Food Insecurity: Not on file   Transportation Needs: Not on file   Physical Activity: Not on file   Stress: Not on file   Social Connections: Not on file   Intimate Partner Violence: Not on file   Housing Stability: Not on file     Allergies   Allergen Reactions    Bee Venom Protein (Honey Bee) Swelling     Current Outpatient Medications on File Prior to Visit   Medication Sig Dispense Refill    aspirin (Adult Low Dose Aspirin) 81 mg EC tablet Take 1 tablet (81 mg) by mouth once daily.      calcium carbonate (TUMS ORAL) Tums      carvedilol (Coreg) 3.125 mg tablet Take 1 tablet (3.125 mg) by mouth 2 times a day with meals.      cholecalciferol (Vitamin D-3) 50 MCG (2000 UT) tablet Take 1 tablet (2,000 Units) by mouth once daily.      EPINEPHrine (Epipen) 0.3 mg/0.3 mL injection syringe Inject 0.3 mL (0.3 mg) into the muscle 1 time if needed for anaphylaxis for up to 1 dose. Inject into upper leg. Call 911 after use. 0.3 mL 0    MULTIVITAMIN ORAL as directed Orally      psyllium husk (METAMUCIL ORAL) Metamucil      sodium chloride (Saline NasaL) 0.65 % nasal spray Administer 2 sprays into each nostril every 2 hours if needed.      [DISCONTINUED] albuterol 2.5 mg /3 mL (0.083 %) nebulizer solution Take 3 mL (2.5 mg) by nebulization 2 times a day.       [DISCONTINUED] levothyroxine (Synthroid, Levoxyl) 50 mcg tablet 1 tablet every morning with 2 tablets on sunday on an empty stomach Orally Once a day for 90 days      [DISCONTINUED] famotidine (Pepcid) 20 mg tablet Take 1 tablet (20 mg) by mouth 2 times a day. 60 tablet 5    [DISCONTINUED] loratadine (Claritin) 10 mg tablet Take 1 tablet (10 mg) by mouth once daily. 30 tablet 2     No current facility-administered medications on file prior to visit.     Immunization History   Administered Date(s) Administered    Flu vaccine, quadrivalent, high-dose, preservative free, age 65y+ (FLUZONE) 10/06/2020, 09/20/2021, 10/04/2022    Influenza, High Dose Seasonal, Preservative Free 10/06/2016, 10/12/2017, 10/25/2018, 10/15/2019    Influenza, seasonal, injectable 09/30/2010, 09/29/2011, 09/27/2012, 10/03/2013, 10/09/2014, 10/01/2015    Novel influenza-H1N1-09, preservative-free 12/13/2009    Pfizer COVID-19 vaccine, bivalent, age 12 years and older (30 mcg/0.3 mL) 10/14/2022    Pfizer Gray Cap SARS-CoV-2 04/17/2022    Pfizer Purple Cap SARS-CoV-2 01/20/2021, 02/11/2021, 09/25/2021    Pneumococcal conjugate vaccine, 13-valent (PREVNAR 13) 09/29/2007, 10/11/2012, 11/01/2016    Pneumococcal polysaccharide vaccine, 23-valent, age 2 years and older (PNEUMOVAX 23) 10/11/2012, 11/12/2020    Td (adult) 10/10/2022    Tdap vaccine, age 7 year and older (BOOSTRIX) 10/10/2022    Zoster, live 08/23/2016     Patient's medical history was reviewed and updated either before or during this encounter.     Kiran John MD

## 2023-12-16 DIAGNOSIS — I42.9 CARDIOMYOPATHY, UNSPECIFIED TYPE (MULTI): Primary | ICD-10-CM

## 2023-12-18 RX ORDER — CARVEDILOL 3.12 MG/1
3.12 TABLET ORAL
Qty: 180 TABLET | Refills: 3 | Status: SHIPPED | OUTPATIENT
Start: 2023-12-18 | End: 2023-12-19 | Stop reason: SDUPTHER

## 2023-12-19 DIAGNOSIS — I42.9 CARDIOMYOPATHY, UNSPECIFIED TYPE (MULTI): ICD-10-CM

## 2023-12-19 RX ORDER — CARVEDILOL 3.12 MG/1
3.12 TABLET ORAL
Qty: 180 TABLET | Refills: 3 | Status: SHIPPED | OUTPATIENT
Start: 2023-12-19

## 2024-01-22 DIAGNOSIS — J47.0 BRONCHIECTASIS WITH ACUTE LOWER RESPIRATORY INFECTION (MULTI): ICD-10-CM

## 2024-01-22 NOTE — TELEPHONE ENCOUNTER
Patient came to [facility], says he only received 1 box of albuterol which is good for only 12 days, need 90 day supply to go to [facility], total of 7 boxes

## 2024-01-24 RX ORDER — ALBUTEROL SULFATE 0.83 MG/ML
2.5 SOLUTION RESPIRATORY (INHALATION) 2 TIMES DAILY
Qty: 750 ML | Refills: 1 | Status: SHIPPED | OUTPATIENT
Start: 2024-01-24 | End: 2024-09-30

## 2024-01-29 ENCOUNTER — TELEPHONE (OUTPATIENT)
Dept: PRIMARY CARE | Facility: CLINIC | Age: 84
End: 2024-01-29
Payer: MEDICARE

## 2024-01-29 NOTE — TELEPHONE ENCOUNTER
Has bronchial problems started coughing up bright red blood over the weekend.  Please advise Dr Kevin montes de ocat is not until MAY

## 2024-01-30 ENCOUNTER — OFFICE VISIT (OUTPATIENT)
Dept: PRIMARY CARE | Facility: CLINIC | Age: 84
End: 2024-01-30
Payer: MEDICARE

## 2024-01-30 ENCOUNTER — LAB (OUTPATIENT)
Dept: LAB | Facility: LAB | Age: 84
End: 2024-01-30
Payer: MEDICARE

## 2024-01-30 VITALS
HEIGHT: 72 IN | TEMPERATURE: 97.8 F | WEIGHT: 159 LBS | DIASTOLIC BLOOD PRESSURE: 60 MMHG | OXYGEN SATURATION: 97 % | SYSTOLIC BLOOD PRESSURE: 104 MMHG | HEART RATE: 52 BPM | BODY MASS INDEX: 21.54 KG/M2

## 2024-01-30 DIAGNOSIS — J47.9 BRONCHIECTASIS WITHOUT COMPLICATION (MULTI): ICD-10-CM

## 2024-01-30 DIAGNOSIS — R63.4 UNINTENTIONAL WEIGHT LOSS: ICD-10-CM

## 2024-01-30 DIAGNOSIS — R04.2 COUGH WITH HEMOPTYSIS: Primary | ICD-10-CM

## 2024-01-30 DIAGNOSIS — R04.2 COUGH WITH HEMOPTYSIS: ICD-10-CM

## 2024-01-30 LAB
ALBUMIN SERPL-MCNC: 4 G/DL (ref 3.5–5)
ALP BLD-CCNC: 102 U/L (ref 35–125)
ALT SERPL-CCNC: 11 U/L (ref 5–40)
ANION GAP SERPL CALC-SCNC: 11 MMOL/L
AST SERPL-CCNC: 15 U/L (ref 5–40)
BILIRUB SERPL-MCNC: 0.3 MG/DL (ref 0.1–1.2)
BUN SERPL-MCNC: 15 MG/DL (ref 8–25)
CALCIUM SERPL-MCNC: 9.2 MG/DL (ref 8.5–10.4)
CHLORIDE SERPL-SCNC: 99 MMOL/L (ref 97–107)
CO2 SERPL-SCNC: 27 MMOL/L (ref 24–31)
CREAT SERPL-MCNC: 0.8 MG/DL (ref 0.4–1.6)
EGFRCR SERPLBLD CKD-EPI 2021: 87 ML/MIN/1.73M*2
ERYTHROCYTE [DISTWIDTH] IN BLOOD BY AUTOMATED COUNT: 13.9 % (ref 11.5–14.5)
GLUCOSE SERPL-MCNC: 93 MG/DL (ref 65–99)
HCT VFR BLD AUTO: 39.2 % (ref 41–52)
HGB BLD-MCNC: 12.3 G/DL (ref 13.5–17.5)
MCH RBC QN AUTO: 28.5 PG (ref 26–34)
MCHC RBC AUTO-ENTMCNC: 31.4 G/DL (ref 32–36)
MCV RBC AUTO: 91 FL (ref 80–100)
NRBC BLD-RTO: 0 /100 WBCS (ref 0–0)
PLATELET # BLD AUTO: 371 X10*3/UL (ref 150–450)
POTASSIUM SERPL-SCNC: 4.9 MMOL/L (ref 3.4–5.1)
PROT SERPL-MCNC: 6.7 G/DL (ref 5.9–7.9)
RBC # BLD AUTO: 4.32 X10*6/UL (ref 4.5–5.9)
SODIUM SERPL-SCNC: 137 MMOL/L (ref 133–145)
TSH SERPL DL<=0.05 MIU/L-ACNC: 2.94 MIU/L (ref 0.27–4.2)
WBC # BLD AUTO: 9.9 X10*3/UL (ref 4.4–11.3)

## 2024-01-30 PROCEDURE — 84443 ASSAY THYROID STIM HORMONE: CPT

## 2024-01-30 PROCEDURE — 99214 OFFICE O/P EST MOD 30 MIN: CPT | Performed by: INTERNAL MEDICINE

## 2024-01-30 PROCEDURE — 1036F TOBACCO NON-USER: CPT | Performed by: INTERNAL MEDICINE

## 2024-01-30 PROCEDURE — 80053 COMPREHEN METABOLIC PANEL: CPT

## 2024-01-30 PROCEDURE — 1126F AMNT PAIN NOTED NONE PRSNT: CPT | Performed by: INTERNAL MEDICINE

## 2024-01-30 PROCEDURE — 3074F SYST BP LT 130 MM HG: CPT | Performed by: INTERNAL MEDICINE

## 2024-01-30 PROCEDURE — 85027 COMPLETE CBC AUTOMATED: CPT

## 2024-01-30 PROCEDURE — 3078F DIAST BP <80 MM HG: CPT | Performed by: INTERNAL MEDICINE

## 2024-01-30 PROCEDURE — 1159F MED LIST DOCD IN RCRD: CPT | Performed by: INTERNAL MEDICINE

## 2024-01-30 PROCEDURE — 36415 COLL VENOUS BLD VENIPUNCTURE: CPT

## 2024-01-30 ASSESSMENT — ENCOUNTER SYMPTOMS
OCCASIONAL FEELINGS OF UNSTEADINESS: 0
DEPRESSION: 0
LOSS OF SENSATION IN FEET: 0

## 2024-01-30 ASSESSMENT — PATIENT HEALTH QUESTIONNAIRE - PHQ9
1. LITTLE INTEREST OR PLEASURE IN DOING THINGS: NOT AT ALL
2. FEELING DOWN, DEPRESSED OR HOPELESS: NOT AT ALL
SUM OF ALL RESPONSES TO PHQ9 QUESTIONS 1 AND 2: 0

## 2024-01-30 ASSESSMENT — PAIN SCALES - GENERAL: PAINLEVEL: 0-NO PAIN

## 2024-01-30 NOTE — PROGRESS NOTES
[facility]: [facility]  PROGRESS NOTE      Héctor Mariano is a 83 y.o. male that is presenting today for Cough (Coughed up blood ).    Assessment/Plan   Diagnoses and all orders for this visit:  Cough with hemoptysis     New could be related to the Bronchiectasis vs Malignancy  -     CT chest w IV contrast; Future  -     CBC; Future  Bronchiectasis without complication (CMS/HCC)       Probably the hemoptysis related to it but will r/o other possibility  -     CT chest w IV contrast; Future  Unintentional weight loss      Around 10 lbs in 6 weeks period  -     Comprehensive metabolic panel; Future  -     TSH with reflex to Free T4 if abnormal; Future  -     CBC; Future  Subjective   - Patient is here today for sick visit for hemoptysis  - Friday night (4 days ago) as he was getting ready for bed he coughed up bright red blood - 1 Tablespoon and Sat he coughed up pinkish phlegm and on Sun he coughed up brownish  stuff, and last evening noted pinkish       - Patient denies any other  symptoms or concerns at this time.    - patient denies any adverse reactions to or concerns with his/her meds.      Review of Systems   Objective   Vitals:    01/30/24 1100   BP: 104/60   Pulse: 52   Temp: 36.6 °C (97.8 °F)   SpO2: 97%      Body mass index is 21.56 kg/m².  Physical Exam  Vitals and nursing note reviewed.   Constitutional:       Appearance: Normal appearance.   HENT:      Head: Normocephalic and atraumatic.      Right Ear: Tympanic membrane, ear canal and external ear normal.      Left Ear: Tympanic membrane, ear canal and external ear normal.      Nose: Nose normal.      Mouth/Throat:      Mouth: Mucous membranes are moist.      Pharynx: Oropharynx is clear.   Neck:      Vascular: No carotid bruit.   Cardiovascular:      Rate and Rhythm: Normal rate and regular rhythm.      Pulses: Normal pulses.      Heart sounds: Normal heart sounds.   Pulmonary:      Effort: Pulmonary effort is normal.       "Breath sounds: Rhonchi (Cleared wuith coughing) present.   Abdominal:      General: Abdomen is flat. Bowel sounds are normal.      Palpations: Abdomen is soft.   Musculoskeletal:         General: No swelling. Normal range of motion.      Cervical back: Neck supple.   Lymphadenopathy:      Cervical: No cervical adenopathy.   Skin:     General: Skin is warm and dry.   Neurological:      Mental Status: He is alert.   Psychiatric:         Mood and Affect: Mood normal.     Diagnostic Results   Lab Results   Component Value Date    GLUCOSE 93 01/30/2024    CALCIUM 9.2 01/30/2024     01/30/2024    K 4.9 01/30/2024    CO2 27 01/30/2024    CL 99 01/30/2024    BUN 15 01/30/2024    CREATININE 0.80 01/30/2024     Lab Results   Component Value Date    ALT 11 01/30/2024    AST 15 01/30/2024    ALKPHOS 102 01/30/2024    BILITOT 0.3 01/30/2024     Lab Results   Component Value Date    WBC 9.9 01/30/2024    HGB 12.3 (L) 01/30/2024    HCT 39.2 (L) 01/30/2024    MCV 91 01/30/2024     01/30/2024     Lab Results   Component Value Date    CHOL 220 (H) 11/21/2023    CHOL 173 10/05/2022    CHOL 200 09/10/2021     Lab Results   Component Value Date    HDL 56.0 11/21/2023    HDL 39 (L) 10/05/2022    HDL 50 09/10/2021     Lab Results   Component Value Date    LDLCALC 141 (H) 11/21/2023    LDLCALC 113 10/05/2022    LDLCALC 130 09/10/2021     Lab Results   Component Value Date    TRIG 113 11/21/2023    TRIG 104 10/05/2022    TRIG 101 09/10/2021     No components found for: \"CHOLHDL\"  Lab Results   Component Value Date    HGBA1C 5.7 (H) 11/21/2023     Other labs not included in the list above were reviewed either before or during this encounter.    History    History reviewed. No pertinent past medical history.  History reviewed. No pertinent surgical history.  Family History   Problem Relation Name Age of Onset    Heart disease Father      Ovarian cancer Paternal Grandmother      Diabetes Paternal Grandfather      Prostate cancer " Paternal Grandfather       Social History     Socioeconomic History    Marital status:      Spouse name: Not on file    Number of children: Not on file    Years of education: Not on file    Highest education level: Not on file   Occupational History    Not on file   Tobacco Use    Smoking status: Former     Types: Cigarettes     Passive exposure: Past    Smokeless tobacco: Never    Tobacco comments:     Pt quit about 55 years ago   Vaping Use    Vaping Use: Never used   Substance and Sexual Activity    Alcohol use: Yes     Comment: very rarely    Drug use: Never    Sexual activity: Not on file   Other Topics Concern    Not on file   Social History Narrative    Not on file     Social Determinants of Health     Financial Resource Strain: Not on file   Food Insecurity: Not on file   Transportation Needs: Not on file   Physical Activity: Not on file   Stress: Not on file   Social Connections: Not on file   Intimate Partner Violence: Not on file   Housing Stability: Not on file     Allergies   Allergen Reactions    Bee Venom Protein (Honey Bee) Swelling     Current Outpatient Medications on File Prior to Visit   Medication Sig Dispense Refill    albuterol 2.5 mg /3 mL (0.083 %) nebulizer solution Take 3 mL (2.5 mg) by nebulization 2 times a day. 750 mL 1    calcium carbonate (TUMS ORAL) Tums      carvedilol (Coreg) 3.125 mg tablet Take 1 tablet (3.125 mg) by mouth 2 times a day with meals. 180 tablet 3    cholecalciferol (Vitamin D-3) 50 MCG (2000 UT) tablet Take 1 tablet (2,000 Units) by mouth once daily.      EPINEPHrine (Epipen) 0.3 mg/0.3 mL injection syringe Inject 0.3 mL (0.3 mg) into the muscle 1 time if needed for anaphylaxis for up to 1 dose. Inject into upper leg. Call 911 after use. 0.3 mL 0    levothyroxine (Synthroid, Levoxyl) 50 mcg tablet 1 tablet every morning with 2 tablets on sunday on an empty stomach Orally Once a day for 90 days 90 tablet 1    MULTIVITAMIN ORAL as directed Orally      psyllium  husk (METAMUCIL ORAL) Metamucil      sodium chloride (Saline NasaL) 0.65 % nasal spray Administer 2 sprays into each nostril every 2 hours if needed.      [DISCONTINUED] aspirin (Adult Low Dose Aspirin) 81 mg EC tablet Take 1 tablet (81 mg) by mouth once daily.       No current facility-administered medications on file prior to visit.     Immunization History   Administered Date(s) Administered    Flu vaccine, quadrivalent, high-dose, preservative free, age 65y+ (FLUZONE) 10/06/2020, 09/20/2021, 10/04/2022    Influenza, High Dose Seasonal, Preservative Free 10/06/2016, 10/12/2017, 10/25/2018, 10/15/2019    Influenza, seasonal, injectable 09/30/2010, 09/29/2011, 09/27/2012, 10/03/2013, 10/09/2014, 10/01/2015    Novel influenza-H1N1-09, preservative-free 12/13/2009    Pfizer COVID-19 vaccine, bivalent, age 12 years and older (30 mcg/0.3 mL) 10/14/2022    Pfizer Gray Cap SARS-CoV-2 04/17/2022    Pfizer Purple Cap SARS-CoV-2 01/20/2021, 02/11/2021, 09/25/2021    Pneumococcal conjugate vaccine, 13-valent (PREVNAR 13) 09/29/2007, 10/11/2012, 11/01/2016    Pneumococcal polysaccharide vaccine, 23-valent, age 2 years and older (PNEUMOVAX 23) 10/11/2012, 11/12/2020    Td (adult) 10/10/2022    Tdap vaccine, age 7 year and older (BOOSTRIX, ADACEL) 10/10/2022    Zoster, live 08/23/2016     Patient's medical history was reviewed and updated either before or during this encounter.       Kiran John MD

## 2024-02-01 ENCOUNTER — HOSPITAL ENCOUNTER (OUTPATIENT)
Dept: RADIOLOGY | Facility: CLINIC | Age: 84
Discharge: HOME | End: 2024-02-01
Payer: MEDICARE

## 2024-02-01 DIAGNOSIS — R04.2 COUGH WITH HEMOPTYSIS: ICD-10-CM

## 2024-02-01 DIAGNOSIS — J47.9 BRONCHIECTASIS WITHOUT COMPLICATION (MULTI): ICD-10-CM

## 2024-02-01 PROCEDURE — 2550000001 HC RX 255 CONTRASTS: Performed by: INTERNAL MEDICINE

## 2024-02-01 PROCEDURE — 71260 CT THORAX DX C+: CPT

## 2024-02-01 RX ADMIN — IOHEXOL 50 ML: 350 INJECTION, SOLUTION INTRAVENOUS at 13:00

## 2024-02-07 ENCOUNTER — CONSULT (OUTPATIENT)
Dept: ALLERGY | Facility: CLINIC | Age: 84
End: 2024-02-07
Payer: MEDICARE

## 2024-02-07 VITALS
HEIGHT: 72 IN | DIASTOLIC BLOOD PRESSURE: 79 MMHG | HEART RATE: 98 BPM | WEIGHT: 157.3 LBS | SYSTOLIC BLOOD PRESSURE: 166 MMHG | BODY MASS INDEX: 21.31 KG/M2

## 2024-02-07 DIAGNOSIS — J30.89 ALLERGIC RHINITIS DUE TO OTHER ALLERGIC TRIGGER, UNSPECIFIED SEASONALITY: Primary | ICD-10-CM

## 2024-02-07 DIAGNOSIS — T78.40XD ALLERGY, SUBSEQUENT ENCOUNTER: ICD-10-CM

## 2024-02-07 PROCEDURE — 95004 PERQ TESTS W/ALRGNC XTRCS: CPT | Performed by: ALLERGY & IMMUNOLOGY

## 2024-02-07 PROCEDURE — 99204 OFFICE O/P NEW MOD 45 MIN: CPT | Performed by: ALLERGY & IMMUNOLOGY

## 2024-02-07 NOTE — PROGRESS NOTES
Subjective   Patient ID:   [id number]   Héctor Mariano is a 83 y.o. male who presents for Allergy Testing.    Chief Complaint   Patient presents with    Allergy Testing          HPI  This patient is here to evaluate for:      The patient reports onset of urticaria beginning:  Lip swelling started October '23 on a Saturday.  Then hives started 2 days later. Itching started Sunday,  Monday, noticed hives on torso, arms, and thighs.   Characteristics: erythematous, pruritic, raised  It reminded him of when he got bit by flies in the yard.     Went to the ED:  Claritin, steroid, pepsid  Helped but then it recurred.   So PCP gave him 5 days of steroids  And used Claritin for 7 days.     NO further rash or swelling.     Duration of individual hives: <24hr  Burning:  no  Bruising: no     The patient states urticaria has worsened with exposure to:   New medications: no  Infection:  he did have a cold prior to this.   He actually took benadryl for the cold and the next day the swelling occurred.   Use of ASA, NSAIDS, or alcohol: no  He used to take baby asa daily for years but stopped it a couple weeks ago bc hemoptysis   He has bronchiectasis. S/p chest CT.   Particular foods:  no     Have you had facial swelling, tongue swelling, throat swelling, trouble swallowing, abdominal cramping, cough, wheeze, or trouble breathing:  No     Also hx of allergy to stinging insects:  Yellow jackets - 15 years ago.   Had hives. A week later, a big hive recurred.   He has an epipen and has carried it ever since.       Review of Systems   All other systems reviewed and are negative.        Objective     /79   Pulse 98   Ht 1.829 m (6')   Wt 71.4 kg (157 lb 4.8 oz)   BMI 21.33 kg/m²      Physical Exam  Constitutional:       General: He is not in acute distress.     Appearance: Normal appearance. He is not ill-appearing.   HENT:      Head: Normocephalic and atraumatic.      Right Ear: Tympanic membrane, ear canal and external ear  normal.      Left Ear: Tympanic membrane, ear canal and external ear normal.      Nose: Nose normal. No congestion or rhinorrhea.      Mouth/Throat:      Mouth: Mucous membranes are moist.      Pharynx: Oropharynx is clear. No oropharyngeal exudate or posterior oropharyngeal erythema.   Eyes:      General:         Right eye: No discharge.         Left eye: No discharge.      Conjunctiva/sclera: Conjunctivae normal.   Cardiovascular:      Rate and Rhythm: Normal rate and regular rhythm.      Heart sounds: Normal heart sounds. No murmur heard.     No friction rub. No gallop.   Pulmonary:      Effort: Pulmonary effort is normal. No respiratory distress.      Breath sounds: Normal breath sounds. No stridor. No wheezing, rhonchi or rales.   Chest:      Chest wall: No tenderness.   Abdominal:      General: Abdomen is flat.      Palpations: Abdomen is soft.   Musculoskeletal:         General: Normal range of motion.      Cervical back: Normal range of motion and neck supple.   Lymphadenopathy:      Cervical: No cervical adenopathy.   Skin:     General: Skin is warm and dry.      Findings: No erythema, lesion or rash.   Neurological:      General: No focal deficit present.      Mental Status: He is alert. Mental status is at baseline.   Psychiatric:         Mood and Affect: Mood normal.         Behavior: Behavior normal.         Thought Content: Thought content normal.         Judgment: Judgment normal.            Current Outpatient Medications   Medication Sig Dispense Refill    albuterol 2.5 mg /3 mL (0.083 %) nebulizer solution Take 3 mL (2.5 mg) by nebulization 2 times a day. 750 mL 1    calcium carbonate (TUMS ORAL) Tums      carvedilol (Coreg) 3.125 mg tablet Take 1 tablet (3.125 mg) by mouth 2 times a day with meals. 180 tablet 3    cholecalciferol (Vitamin D-3) 50 MCG (2000 UT) tablet Take 1 tablet (2,000 Units) by mouth once daily.      EPINEPHrine (Epipen) 0.3 mg/0.3 mL injection syringe Inject 0.3 mL (0.3 mg)  into the muscle 1 time if needed for anaphylaxis for up to 1 dose. Inject into upper leg. Call 911 after use. 0.3 mL 0    levothyroxine (Synthroid, Levoxyl) 50 mcg tablet 1 tablet every morning with 2 tablets on sunday on an empty stomach Orally Once a day for 90 days 90 tablet 1    MULTIVITAMIN ORAL as directed Orally      psyllium husk (METAMUCIL ORAL) Metamucil      sodium chloride (Saline NasaL) 0.65 % nasal spray Administer 2 sprays into each nostril every 2 hours if needed.       No current facility-administered medications for this visit.       Summary of the labs over the past 6 months:    Lab on 01/30/2024   Component Date Value Ref Range Status    WBC 01/30/2024 9.9  4.4 - 11.3 x10*3/uL Final    nRBC 01/30/2024 0.0  0.0 - 0.0 /100 WBCs Final    RBC 01/30/2024 4.32 (L)  4.50 - 5.90 x10*6/uL Final    Hemoglobin 01/30/2024 12.3 (L)  13.5 - 17.5 g/dL Final    Hematocrit 01/30/2024 39.2 (L)  41.0 - 52.0 % Final    MCV 01/30/2024 91  80 - 100 fL Final    MCH 01/30/2024 28.5  26.0 - 34.0 pg Final    MCHC 01/30/2024 31.4 (L)  32.0 - 36.0 g/dL Final    RDW 01/30/2024 13.9  11.5 - 14.5 % Final    Platelets 01/30/2024 371  150 - 450 x10*3/uL Final    Glucose 01/30/2024 93  65 - 99 mg/dL Final    Sodium 01/30/2024 137  133 - 145 mmol/L Final    Potassium 01/30/2024 4.9  3.4 - 5.1 mmol/L Final    Chloride 01/30/2024 99  97 - 107 mmol/L Final    Bicarbonate 01/30/2024 27  24 - 31 mmol/L Final    Urea Nitrogen 01/30/2024 15  8 - 25 mg/dL Final    Creatinine 01/30/2024 0.80  0.40 - 1.60 mg/dL Final    eGFR 01/30/2024 87  >60 mL/min/1.73m*2 Final    Calcium 01/30/2024 9.2  8.5 - 10.4 mg/dL Final    Albumin 01/30/2024 4.0  3.5 - 5.0 g/dL Final    Alkaline Phosphatase 01/30/2024 102  35 - 125 U/L Final    Total Protein 01/30/2024 6.7  5.9 - 7.9 g/dL Final    AST 01/30/2024 15  5 - 40 U/L Final    Bilirubin, Total 01/30/2024 0.3  0.1 - 1.2 mg/dL Final    ALT 01/30/2024 11  5 - 40 U/L Final    Anion Gap 01/30/2024 11  <=19  mmol/L Final    Thyroid Stimulating Hormone 01/30/2024 2.94  0.27 - 4.20 mIU/L Final   Lab on 11/21/2023   Component Date Value Ref Range Status    Glucose 11/21/2023 94  65 - 99 mg/dL Final    Sodium 11/21/2023 138  133 - 145 mmol/L Final    Potassium 11/21/2023 4.8  3.4 - 5.1 mmol/L Final    Chloride 11/21/2023 99  97 - 107 mmol/L Final    Bicarbonate 11/21/2023 26  24 - 31 mmol/L Final    Urea Nitrogen 11/21/2023 13  8 - 25 mg/dL Final    Creatinine 11/21/2023 0.80  0.40 - 1.60 mg/dL Final    eGFR 11/21/2023 87  >60 mL/min/1.73m*2 Final    Calcium 11/21/2023 9.2  8.5 - 10.4 mg/dL Final    Anion Gap 11/21/2023 13  <=19 mmol/L Final    WBC 11/21/2023 8.2  4.4 - 11.3 x10*3/uL Final    nRBC 11/21/2023 0.0  0.0 - 0.0 /100 WBCs Final    RBC 11/21/2023 4.44 (L)  4.50 - 5.90 x10*6/uL Final    Hemoglobin 11/21/2023 12.8 (L)  13.5 - 17.5 g/dL Final    Hematocrit 11/21/2023 39.6 (L)  41.0 - 52.0 % Final    MCV 11/21/2023 89  80 - 100 fL Final    MCH 11/21/2023 28.8  26.0 - 34.0 pg Final    MCHC 11/21/2023 32.3  32.0 - 36.0 g/dL Final    RDW 11/21/2023 14.2  11.5 - 14.5 % Final    Platelets 11/21/2023 366  150 - 450 x10*3/uL Final    Neutrophils % 11/21/2023 49.3  40.0 - 80.0 % Final    Immature Granulocytes %, Automated 11/21/2023 0.2  0.0 - 0.9 % Final    Lymphocytes % 11/21/2023 41.0  13.0 - 44.0 % Final    Monocytes % 11/21/2023 7.7  2.0 - 10.0 % Final    Eosinophils % 11/21/2023 1.3  0.0 - 6.0 % Final    Basophils % 11/21/2023 0.5  0.0 - 2.0 % Final    Neutrophils Absolute 11/21/2023 4.02  1.60 - 5.50 x10*3/uL Final    Immature Granulocytes Absolute, Au* 11/21/2023 0.02  0.00 - 0.50 x10*3/uL Final    Lymphocytes Absolute 11/21/2023 3.35 (H)  0.80 - 3.00 x10*3/uL Final    Monocytes Absolute 11/21/2023 0.63  0.05 - 0.80 x10*3/uL Final    Eosinophils Absolute 11/21/2023 0.11  0.00 - 0.40 x10*3/uL Final    Basophils Absolute 11/21/2023 0.04  0.00 - 0.10 x10*3/uL Final    Hemoglobin A1C 11/21/2023 5.7 (H)  See below % Final     Estimated Average Glucose 11/21/2023 117  Not Established mg/dL Final    Cholesterol 11/21/2023 220 (H)  133 - 200 mg/dL Final    HDL-Cholesterol 11/21/2023 56.0  >40.0 mg/dL Final    Cholesterol/HDL Ratio 11/21/2023 3.9  SEE COMMENT Final    LDL Calculated 11/21/2023 141 (H)  65 - 130 mg/dL Final    Triglycerides 11/21/2023 113  40 - 150 mg/dL Final    AST 11/21/2023 18  5 - 40 U/L Final    ALT 11/21/2023 13  5 - 40 U/L Final    Alkaline Phosphatase 11/21/2023 109  35 - 125 U/L Final    Bilirubin, Total 11/21/2023 0.3  0.1 - 1.2 mg/dL Final    Bilirubin, Direct 11/21/2023 <0.2  0.0 - 0.2 mg/dL Final    Total Protein 11/21/2023 6.7  5.9 - 7.9 g/dL Final    Albumin 11/21/2023 4.0  3.5 - 5.0 g/dL Final    Vitamin D, 25-Hydroxy, Total 11/21/2023 54  31 - 100 ng/mL Final    WBC, Urine 11/21/2023 NONE  1-5, NONE /HPF Final    RBC, Urine 11/21/2023 NONE  NONE, 1-2, 3-5 /HPF Final    Prostate Specific Antigen,Screen 11/21/2023 0.80  <=4.10 ng/mL Final    Thyroid Stimulating Hormone 11/21/2023 4.05  0.27 - 4.20 mIU/L Final     Scratch skin tests were positive to: low to mosquito  Otherwise, limited panel of testing is negative  SCRATCH SKIN TESTING:  No allergies to dust mites, molds, pollens, cats, or dogs.   The allergic rhinitis and conjunctivitis by hx appears to have resolved over time.   FOOD SCRATCH SKIN TESTING:  No allergies to EGG, MILK, SOY, WHEAT, CODFISH, SHRIMP, WALNUT, AND PEANUT.    Assessment/Plan   Diagnoses and all orders for this visit:  Allergic rhinitis due to other allergic trigger, unspecified seasonality  Allergy, subsequent encounter  -     Referral to Allergy    We performed allergy testing to help determine the etiology of your symptoms. We discussed the results of the testing. Also, we started to focus on treating your problem and we reviewed the management plan.   Fortunately,  the history did not identify any specific red flags of concern and no additional testing is needed at this time.      Most likely, a viral infection triggered the hives/swelling. No anaphylaxis occurred and so we did not need to prescribe an epinephrine auto-injector for this reason.   We did discuss that common triggers for hives include pain medications such as NSAID's and narcotics, alcohol, and some physical triggers like heat, pressure.   I would treat future hives with a non-sedating antihistamine like Claritin, Zyrtec or Allegra.      Check venom testing to evaluate for this allergy.   If negative, he can discontinue carrying a epipen. SCIT is not needed.       Cheng Mendieta MD

## 2024-02-26 ENCOUNTER — TELEPHONE (OUTPATIENT)
Dept: PRIMARY CARE | Facility: CLINIC | Age: 84
End: 2024-02-26
Payer: MEDICARE

## 2024-02-26 NOTE — TELEPHONE ENCOUNTER
Pt c/o pain on left side since 2/20/24 that has been progressively worsening.  States he had kidney stone in the past and was told he has a 3 cm stone still in there.  Suspects this pain could be the stone.  Asking for further instructions.  Asking if he needs an XR.  Please advise.  Ph: [phone]

## 2024-02-26 NOTE — TELEPHONE ENCOUNTER
Pt states pain has subsided.  Believes he may have just passed the stone.  Will call back if pain returns.

## 2024-03-13 ENCOUNTER — APPOINTMENT (OUTPATIENT)
Dept: ALLERGY | Facility: CLINIC | Age: 84
End: 2024-03-13
Payer: MEDICARE

## 2024-05-14 PROBLEM — T14.8XXA LOCAL INFECTION OF WOUND: Status: ACTIVE | Noted: 2024-05-14

## 2024-05-14 PROBLEM — L08.9 LOCAL INFECTION OF WOUND: Status: ACTIVE | Noted: 2024-05-14

## 2024-05-14 PROBLEM — R04.2 HEMOPTYSIS: Status: ACTIVE | Noted: 2023-01-18

## 2024-05-14 PROBLEM — E78.00 HYPERCHOLESTEROLEMIA: Status: ACTIVE | Noted: 2023-11-21

## 2024-05-14 PROBLEM — E55.9 VITAMIN D DEFICIENCY: Status: ACTIVE | Noted: 2023-11-21

## 2024-05-14 PROBLEM — R10.9 FLANK PAIN: Status: ACTIVE | Noted: 2023-02-02

## 2024-05-14 PROBLEM — I10 HYPERTENSION: Status: ACTIVE | Noted: 2022-10-05

## 2024-05-14 PROBLEM — J30.9 ALLERGIC RHINITIS: Status: ACTIVE | Noted: 2024-05-14

## 2024-05-14 PROBLEM — I25.5 ISCHEMIC MYOCARDIAL DYSFUNCTION: Status: ACTIVE | Noted: 2024-05-14

## 2024-05-14 PROBLEM — E03.9 HYPOTHYROIDISM: Status: ACTIVE | Noted: 2023-01-16

## 2024-05-14 PROBLEM — S81.819A LACERATION OF LOWER EXTREMITY: Status: ACTIVE | Noted: 2024-05-14

## 2024-05-14 PROBLEM — J47.9 BRONCHIECTASIS (MULTI): Status: ACTIVE | Noted: 2023-01-18

## 2024-05-14 PROBLEM — L50.9 URTICARIA: Status: ACTIVE | Noted: 2024-05-14

## 2024-05-14 RX ORDER — ASPIRIN 81 MG/1
TABLET ORAL EVERY 24 HOURS
COMMUNITY
End: 2024-06-03 | Stop reason: WASHOUT

## 2024-06-04 ENCOUNTER — OFFICE VISIT (OUTPATIENT)
Dept: PRIMARY CARE | Facility: CLINIC | Age: 84
End: 2024-06-04
Payer: MEDICARE

## 2024-06-04 VITALS
DIASTOLIC BLOOD PRESSURE: 80 MMHG | HEART RATE: 86 BPM | TEMPERATURE: 97.5 F | WEIGHT: 159 LBS | OXYGEN SATURATION: 97 % | BODY MASS INDEX: 21.54 KG/M2 | SYSTOLIC BLOOD PRESSURE: 140 MMHG | HEIGHT: 72 IN

## 2024-06-04 DIAGNOSIS — R26.89 BALANCE PROBLEM: ICD-10-CM

## 2024-06-04 DIAGNOSIS — R73.03 PREDIABETES: ICD-10-CM

## 2024-06-04 DIAGNOSIS — E55.9 VITAMIN D DEFICIENCY: ICD-10-CM

## 2024-06-04 DIAGNOSIS — E03.9 ACQUIRED HYPOTHYROIDISM: Primary | ICD-10-CM

## 2024-06-04 DIAGNOSIS — I42.9 CARDIOMYOPATHY, UNSPECIFIED TYPE (MULTI): ICD-10-CM

## 2024-06-04 DIAGNOSIS — Z01.89 ENCOUNTER FOR ROUTINE LABORATORY TESTING: ICD-10-CM

## 2024-06-04 DIAGNOSIS — I47.10 SUPRAVENTRICULAR TACHYCARDIA (CMS-HCC): ICD-10-CM

## 2024-06-04 DIAGNOSIS — Z12.5 ENCOUNTER FOR PROSTATE CANCER SCREENING: ICD-10-CM

## 2024-06-04 DIAGNOSIS — R79.9 ABNORMAL FINDING OF BLOOD CHEMISTRY, UNSPECIFIED: ICD-10-CM

## 2024-06-04 PROBLEM — E11.65 TYPE 2 DIABETES MELLITUS WITH HYPERGLYCEMIA (MULTI): Status: RESOLVED | Noted: 2023-09-01 | Resolved: 2024-06-04

## 2024-06-04 LAB — POC HEMOGLOBIN A1C: 5.1 % (ref 4.2–6.5)

## 2024-06-04 PROCEDURE — 1126F AMNT PAIN NOTED NONE PRSNT: CPT | Performed by: INTERNAL MEDICINE

## 2024-06-04 PROCEDURE — 99213 OFFICE O/P EST LOW 20 MIN: CPT | Performed by: INTERNAL MEDICINE

## 2024-06-04 PROCEDURE — 3077F SYST BP >= 140 MM HG: CPT | Performed by: INTERNAL MEDICINE

## 2024-06-04 PROCEDURE — 1159F MED LIST DOCD IN RCRD: CPT | Performed by: INTERNAL MEDICINE

## 2024-06-04 PROCEDURE — 3079F DIAST BP 80-89 MM HG: CPT | Performed by: INTERNAL MEDICINE

## 2024-06-04 PROCEDURE — 83036 HEMOGLOBIN GLYCOSYLATED A1C: CPT | Mod: MUE | Performed by: INTERNAL MEDICINE

## 2024-06-04 RX ORDER — LEVOTHYROXINE SODIUM 50 UG/1
TABLET ORAL
Qty: 115 TABLET | Refills: 1 | Status: SHIPPED | OUTPATIENT
Start: 2024-06-04

## 2024-06-04 ASSESSMENT — PAIN SCALES - GENERAL: PAINLEVEL: 0-NO PAIN

## 2024-06-04 NOTE — PROGRESS NOTES
[facility]: [facility]  PROGRESS NOTE      Héctor Mariano is a 83 y.o. male that is presenting today for Follow-up (6 mo).    Assessment/Plan   Diagnoses and all orders for this visit:  Acquired hypothyroidism    Under control with current treatment   Continue the same   Rx E-scripted 100 days x 1  -     levothyroxine (Synthroid, Levoxyl) 50 mcg tablet; 1 tablet every morning with 2 tablets on sunday on an empty stomach Orally Once a day for 90 days  -     Hemoglobin A1C; Future  -     TSH with reflex to Free T4 if abnormal; Future  Balance problem        PT referral ( chronic vestibular/ legs weakness )  Cardiomyopathy, unspecified type (Multi)     Under control with current treatment   Continue the same   -     Comprehensive Metabolic Panel; Future  -     Hemoglobin A1C; Future  Supraventricular tachycardia (CMS-HCC)     Under control with current treatment   Continue the same   Prediabetes      Well controlled with diet  -     POCT glycosylated hemoglobin (Hb A1C) manually resulted  A1C 5.1 and exercise     Continue same   -     Comprehensive Metabolic Panel; Future  -     Hemoglobin A1C; Future  Encounter for routine laboratory testing  -     Comprehensive Metabolic Panel; Future  -     CBC and Auto Differential; Future  -     Lipid Panel; Future  -     Hemoglobin A1C; Future  -     Vitamin D 25-Hydroxy,Total (for eval of Vitamin D levels); Future  -     TSH with reflex to Free T4 if abnormal; Future  -     Prostate Specific Antigen; Future  Vitamin D deficiency  -     Vitamin D 25-Hydroxy,Total (for eval of Vitamin D levels); Future  Encounter for prostate cancer screening  -     Prostate Specific Antigen; Future  Abnormal finding of blood chemistry, unspecified  -     CBC and Auto Differential; Future  -     Lipid Panel; Future  Other orders  -     Follow Up In Primary Care  -     Follow Up In Primary Care; Future  Subjective     - Hécotr Mariano 83 y.o. male is here today for his 6  months FUV / A1C and refills       - Patient denies any symptoms or concerns at this time.       - patient denies any adverse reactions to or concerns with his/her meds.       - Problem list and medication reconciliation done today.  - V.S. Stable. No changes at this time.  - Encouraged continued diet and exercise modification.    Review of Systems   All pertinent POSITIVES as noted per HPI.  All other systems have been reviewed and are NEGATIVE and /or Noncontributory to this patient current visit or complaint.    Objective   Vitals:    06/04/24 1013   BP: 140/80   Pulse: 86   Temp: 36.4 °C (97.5 °F)   SpO2: 97%      Body mass index is 21.56 kg/m².  Physical Exam  Vitals and nursing note reviewed.   Constitutional:       Appearance: Normal appearance.   HENT:      Head: Normocephalic and atraumatic.   Neck:      Vascular: No carotid bruit.   Cardiovascular:      Rate and Rhythm: Normal rate and regular rhythm.      Pulses: Normal pulses.      Heart sounds: Normal heart sounds.   Pulmonary:      Effort: Pulmonary effort is normal.      Breath sounds: Normal breath sounds.   Abdominal:      General: Abdomen is flat. Bowel sounds are normal.      Palpations: Abdomen is soft.   Musculoskeletal:         General: No swelling. Normal range of motion.      Cervical back: Neck supple.   Lymphadenopathy:      Cervical: No cervical adenopathy.   Skin:     General: Skin is warm and dry.   Neurological:      Mental Status: He is alert.   Psychiatric:         Mood and Affect: Mood normal.       Diagnostic Results   Lab Results   Component Value Date    GLUCOSE 93 01/30/2024    CALCIUM 9.2 01/30/2024     01/30/2024    K 4.9 01/30/2024    CO2 27 01/30/2024    CL 99 01/30/2024    BUN 15 01/30/2024    CREATININE 0.80 01/30/2024     Lab Results   Component Value Date    ALT 11 01/30/2024    AST 15 01/30/2024    ALKPHOS 102 01/30/2024    BILITOT 0.3 01/30/2024     Lab Results   Component Value Date    WBC 9.9 01/30/2024    HGB  "12.3 (L) 2024    HCT 39.2 (L) 2024    MCV 91 2024     2024     Lab Results   Component Value Date    CHOL 220 (H) 2023    CHOL 173 10/05/2022    CHOL 200 09/10/2021     Lab Results   Component Value Date    HDL 56.0 2023    HDL 39 (L) 10/05/2022    HDL 50 09/10/2021     Lab Results   Component Value Date    LDLCALC 141 (H) 2023    LDLCALC 113 10/05/2022    LDLCALC 130 09/10/2021     Lab Results   Component Value Date    TRIG 113 2023    TRIG 104 10/05/2022    TRIG 101 09/10/2021     No components found for: \"CHOLHDL\"  Lab Results   Component Value Date    HGBA1C 5.1 2024     Other labs not included in the list above were reviewed either before or during this encounter.    History    History reviewed. No pertinent past medical history.  History reviewed. No pertinent surgical history.  Family History   Problem Relation Name Age of Onset    Heart disease Father      Ovarian cancer Paternal Grandmother      Diabetes Paternal Grandfather      Prostate cancer Paternal Grandfather       Social History     Socioeconomic History    Marital status:      Spouse name: Not on file    Number of children: Not on file    Years of education: Not on file    Highest education level: Not on file   Occupational History    Not on file   Tobacco Use    Smoking status: Former     Current packs/day: 0.00     Types: Cigarettes     Quit date:      Years since quittin.4     Passive exposure: Past    Smokeless tobacco: Never    Tobacco comments:     Pt quit about 55 years ago   Vaping Use    Vaping status: Never Used   Substance and Sexual Activity    Alcohol use: Not Currently     Comment: very rarely    Drug use: Never    Sexual activity: Not on file   Other Topics Concern    Not on file   Social History Narrative    Not on file     Social Determinants of Health     Financial Resource Strain: Not on file   Food Insecurity: Not on file   Transportation Needs: Not on " file   Physical Activity: Not on file   Stress: Not on file   Social Connections: Not on file   Intimate Partner Violence: Not on file   Housing Stability: Not on file     Allergies   Allergen Reactions    Bee Venom Protein (Honey Bee) Swelling     Current Outpatient Medications on File Prior to Visit   Medication Sig Dispense Refill    albuterol 2.5 mg /3 mL (0.083 %) nebulizer solution Take 3 mL (2.5 mg) by nebulization 2 times a day. 750 mL 1    calcium carbonate (TUMS ORAL) Tums      carvedilol (Coreg) 3.125 mg tablet Take 1 tablet (3.125 mg) by mouth 2 times a day with meals. 180 tablet 3    cholecalciferol (Vitamin D-3) 50 MCG (2000 UT) tablet Take 1 tablet (2,000 Units) by mouth once daily.      EPINEPHrine (Epipen) 0.3 mg/0.3 mL injection syringe Inject 0.3 mL (0.3 mg) into the muscle 1 time if needed for anaphylaxis for up to 1 dose. Inject into upper leg. Call 911 after use. 0.3 mL 0    MULTIVITAMIN ORAL as directed Orally      psyllium husk (METAMUCIL ORAL) Metamucil      sodium chloride (Saline NasaL) 0.65 % nasal spray Administer 2 sprays into each nostril every 2 hours if needed.      [DISCONTINUED] levothyroxine (Synthroid, Levoxyl) 50 mcg tablet 1 tablet every morning with 2 tablets on sunday on an empty stomach Orally Once a day for 90 days 90 tablet 1    [DISCONTINUED] aspirin 81 mg EC tablet Take by mouth once every 24 hours.       No current facility-administered medications on file prior to visit.     Immunization History   Administered Date(s) Administered    Flu vaccine, quadrivalent, high-dose, preservative free, age 65y+ (FLUZONE) 10/06/2020, 09/20/2021, 10/04/2022, 10/04/2023    Influenza, High Dose Seasonal, Preservative Free 10/06/2016, 10/12/2017, 10/25/2018, 10/15/2019    Influenza, seasonal, injectable 09/30/2010, 09/29/2011, 09/27/2012, 10/03/2013, 10/09/2014, 10/01/2015    Novel influenza-H1N1-09, preservative-free 12/13/2009    Pfizer COVID-19 vaccine, bivalent, age 12 years and  older (30 mcg/0.3 mL) 10/14/2022    Pfizer Gray Cap SARS-CoV-2 04/17/2022    Pfizer Purple Cap SARS-CoV-2 01/20/2021, 02/11/2021, 09/25/2021    Pneumococcal conjugate vaccine, 13-valent (PREVNAR 13) 09/29/2007, 10/11/2012, 11/01/2016    Pneumococcal polysaccharide vaccine, 23-valent, age 2 years and older (PNEUMOVAX 23) 10/11/2012, 11/12/2020    Td (adult) 10/10/2022    Tdap vaccine, age 7 year and older (BOOSTRIX, ADACEL) 10/10/2022    Zoster, live 08/23/2016     Patient's medical history was reviewed and updated either before or during this encounter.       Kiran John MD

## 2024-06-25 ENCOUNTER — OFFICE VISIT (OUTPATIENT)
Dept: CARDIOLOGY | Facility: CLINIC | Age: 84
End: 2024-06-25
Payer: MEDICARE

## 2024-06-25 VITALS — DIASTOLIC BLOOD PRESSURE: 78 MMHG | SYSTOLIC BLOOD PRESSURE: 138 MMHG | HEART RATE: 84 BPM

## 2024-06-25 DIAGNOSIS — I49.1 ATRIAL PREMATURE COMPLEX: ICD-10-CM

## 2024-06-25 DIAGNOSIS — I49.9 VENTRICULAR ARRHYTHMIA: ICD-10-CM

## 2024-06-25 DIAGNOSIS — I49.8 ATRIAL ARRHYTHMIA: Primary | ICD-10-CM

## 2024-06-25 PROCEDURE — 3075F SYST BP GE 130 - 139MM HG: CPT | Performed by: INTERNAL MEDICINE

## 2024-06-25 PROCEDURE — 99214 OFFICE O/P EST MOD 30 MIN: CPT | Performed by: INTERNAL MEDICINE

## 2024-06-25 PROCEDURE — 3078F DIAST BP <80 MM HG: CPT | Performed by: INTERNAL MEDICINE

## 2024-06-25 PROCEDURE — 1036F TOBACCO NON-USER: CPT | Performed by: INTERNAL MEDICINE

## 2024-06-25 PROCEDURE — 93010 ELECTROCARDIOGRAM REPORT: CPT | Performed by: INTERNAL MEDICINE

## 2024-06-25 PROCEDURE — 1159F MED LIST DOCD IN RCRD: CPT | Performed by: INTERNAL MEDICINE

## 2024-06-25 PROCEDURE — 93005 ELECTROCARDIOGRAM TRACING: CPT | Performed by: INTERNAL MEDICINE

## 2024-06-25 ASSESSMENT — PATIENT HEALTH QUESTIONNAIRE - PHQ9
SUM OF ALL RESPONSES TO PHQ9 QUESTIONS 1 AND 2: 0
1. LITTLE INTEREST OR PLEASURE IN DOING THINGS: NOT AT ALL
2. FEELING DOWN, DEPRESSED OR HOPELESS: NOT AT ALL

## 2024-06-25 ASSESSMENT — ENCOUNTER SYMPTOMS
GASTROINTESTINAL NEGATIVE: 1
RESPIRATORY NEGATIVE: 1
PSYCHIATRIC NEGATIVE: 1
EYES NEGATIVE: 1
CARDIOVASCULAR NEGATIVE: 1
NEUROLOGICAL NEGATIVE: 1
WEIGHT LOSS: 1
ENDOCRINE NEGATIVE: 1

## 2024-06-25 ASSESSMENT — COLUMBIA-SUICIDE SEVERITY RATING SCALE - C-SSRS
2. HAVE YOU ACTUALLY HAD ANY THOUGHTS OF KILLING YOURSELF?: NO
6. HAVE YOU EVER DONE ANYTHING, STARTED TO DO ANYTHING, OR PREPARED TO DO ANYTHING TO END YOUR LIFE?: NO
1. IN THE PAST MONTH, HAVE YOU WISHED YOU WERE DEAD OR WISHED YOU COULD GO TO SLEEP AND NOT WAKE UP?: NO
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
## 2024-09-23 DIAGNOSIS — J47.0 BRONCHIECTASIS WITH ACUTE LOWER RESPIRATORY INFECTION (MULTI): ICD-10-CM

## 2024-09-24 RX ORDER — ALBUTEROL SULFATE 0.83 MG/ML
SOLUTION RESPIRATORY (INHALATION)
Qty: 540 ML | Refills: 3 | Status: SHIPPED | OUTPATIENT
Start: 2024-09-24

## 2024-11-26 ENCOUNTER — TELEPHONE (OUTPATIENT)
Dept: PRIMARY CARE | Facility: CLINIC | Age: 84
End: 2024-11-26

## 2024-11-26 ENCOUNTER — LAB (OUTPATIENT)
Dept: LAB | Facility: LAB | Age: 84
End: 2024-11-26
Payer: MEDICARE

## 2024-11-26 DIAGNOSIS — R79.9 ABNORMAL FINDING OF BLOOD CHEMISTRY, UNSPECIFIED: ICD-10-CM

## 2024-11-26 DIAGNOSIS — R73.03 PREDIABETES: ICD-10-CM

## 2024-11-26 DIAGNOSIS — E03.9 ACQUIRED HYPOTHYROIDISM: ICD-10-CM

## 2024-11-26 DIAGNOSIS — I42.9 CARDIOMYOPATHY, UNSPECIFIED TYPE (MULTI): ICD-10-CM

## 2024-11-26 DIAGNOSIS — Z01.89 ENCOUNTER FOR ROUTINE LABORATORY TESTING: ICD-10-CM

## 2024-11-26 DIAGNOSIS — E55.9 VITAMIN D DEFICIENCY: ICD-10-CM

## 2024-11-26 DIAGNOSIS — R39.9 URINARY SYMPTOM OR SIGN: ICD-10-CM

## 2024-11-26 DIAGNOSIS — Z12.5 ENCOUNTER FOR PROSTATE CANCER SCREENING: ICD-10-CM

## 2024-11-26 LAB
25(OH)D3 SERPL-MCNC: 53 NG/ML (ref 30–100)
ALBUMIN SERPL BCP-MCNC: 3.6 G/DL (ref 3.4–5)
ALP SERPL-CCNC: 78 U/L (ref 33–136)
ALT SERPL W P-5'-P-CCNC: 11 U/L (ref 10–52)
ANION GAP SERPL CALCULATED.3IONS-SCNC: 11 MMOL/L (ref 10–20)
APPEARANCE UR: CLEAR
AST SERPL W P-5'-P-CCNC: 15 U/L (ref 9–39)
BASOPHILS # BLD AUTO: 0.03 X10*3/UL (ref 0–0.1)
BASOPHILS NFR BLD AUTO: 0.3 %
BILIRUB SERPL-MCNC: 0.4 MG/DL (ref 0–1.2)
BILIRUB UR STRIP.AUTO-MCNC: NEGATIVE MG/DL
BUN SERPL-MCNC: 15 MG/DL (ref 6–23)
CALCIUM SERPL-MCNC: 8.9 MG/DL (ref 8.6–10.3)
CHLORIDE SERPL-SCNC: 97 MMOL/L (ref 98–107)
CHOLEST SERPL-MCNC: 164 MG/DL (ref 0–199)
CHOLEST/HDLC SERPL: 3.3 {RATIO}
CO2 SERPL-SCNC: 30 MMOL/L (ref 21–32)
COLOR UR: NORMAL
CREAT SERPL-MCNC: 0.78 MG/DL (ref 0.5–1.3)
EGFRCR SERPLBLD CKD-EPI 2021: 87 ML/MIN/1.73M*2
EOSINOPHIL # BLD AUTO: 0.18 X10*3/UL (ref 0–0.4)
EOSINOPHIL NFR BLD AUTO: 1.6 %
ERYTHROCYTE [DISTWIDTH] IN BLOOD BY AUTOMATED COUNT: 13.6 % (ref 11.5–14.5)
EST. AVERAGE GLUCOSE BLD GHB EST-MCNC: 114 MG/DL
GLUCOSE SERPL-MCNC: 97 MG/DL (ref 74–99)
GLUCOSE UR STRIP.AUTO-MCNC: NORMAL MG/DL
HBA1C MFR BLD: 5.6 %
HCT VFR BLD AUTO: 36.3 % (ref 41–52)
HDLC SERPL-MCNC: 49.7 MG/DL
HGB BLD-MCNC: 11.7 G/DL (ref 13.5–17.5)
HOLD SPECIMEN: NORMAL
IMM GRANULOCYTES # BLD AUTO: 0.04 X10*3/UL (ref 0–0.5)
IMM GRANULOCYTES NFR BLD AUTO: 0.3 % (ref 0–0.9)
KETONES UR STRIP.AUTO-MCNC: NEGATIVE MG/DL
LDLC SERPL CALC-MCNC: 100 MG/DL
LEUKOCYTE ESTERASE UR QL STRIP.AUTO: NEGATIVE
LYMPHOCYTES # BLD AUTO: 2.89 X10*3/UL (ref 0.8–3)
LYMPHOCYTES NFR BLD AUTO: 25.3 %
MCH RBC QN AUTO: 27.9 PG (ref 26–34)
MCHC RBC AUTO-ENTMCNC: 32.2 G/DL (ref 32–36)
MCV RBC AUTO: 87 FL (ref 80–100)
MONOCYTES # BLD AUTO: 0.95 X10*3/UL (ref 0.05–0.8)
MONOCYTES NFR BLD AUTO: 8.3 %
NEUTROPHILS # BLD AUTO: 7.34 X10*3/UL (ref 1.6–5.5)
NEUTROPHILS NFR BLD AUTO: 64.2 %
NITRITE UR QL STRIP.AUTO: NEGATIVE
NON HDL CHOLESTEROL: 114 MG/DL (ref 0–149)
NRBC BLD-RTO: 0 /100 WBCS (ref 0–0)
PH UR STRIP.AUTO: 5.5 [PH]
PLATELET # BLD AUTO: 455 X10*3/UL (ref 150–450)
POTASSIUM SERPL-SCNC: 4.5 MMOL/L (ref 3.5–5.3)
PROT SERPL-MCNC: 6.9 G/DL (ref 6.4–8.2)
PROT UR STRIP.AUTO-MCNC: NEGATIVE MG/DL
PSA SERPL-MCNC: 0.31 NG/ML
RBC # BLD AUTO: 4.19 X10*6/UL (ref 4.5–5.9)
RBC # UR STRIP.AUTO: NEGATIVE /UL
SODIUM SERPL-SCNC: 133 MMOL/L (ref 136–145)
SP GR UR STRIP.AUTO: 1.02
TRIGL SERPL-MCNC: 71 MG/DL (ref 0–149)
TSH SERPL-ACNC: 3.07 MIU/L (ref 0.44–3.98)
UROBILINOGEN UR STRIP.AUTO-MCNC: NORMAL MG/DL
VLDL: 14 MG/DL (ref 0–40)
WBC # BLD AUTO: 11.4 X10*3/UL (ref 4.4–11.3)

## 2024-11-26 PROCEDURE — 82306 VITAMIN D 25 HYDROXY: CPT

## 2024-11-26 PROCEDURE — 80061 LIPID PANEL: CPT

## 2024-11-26 PROCEDURE — 36415 COLL VENOUS BLD VENIPUNCTURE: CPT

## 2024-11-26 PROCEDURE — 81003 URINALYSIS AUTO W/O SCOPE: CPT

## 2024-11-26 PROCEDURE — 80053 COMPREHEN METABOLIC PANEL: CPT

## 2024-11-26 PROCEDURE — 83036 HEMOGLOBIN GLYCOSYLATED A1C: CPT

## 2024-11-26 PROCEDURE — 84443 ASSAY THYROID STIM HORMONE: CPT

## 2024-11-26 PROCEDURE — 85025 COMPLETE CBC W/AUTO DIFF WBC: CPT

## 2024-11-26 PROCEDURE — G0103 PSA SCREENING: HCPCS

## 2024-12-05 ENCOUNTER — APPOINTMENT (OUTPATIENT)
Dept: PRIMARY CARE | Facility: CLINIC | Age: 84
End: 2024-12-05
Payer: MEDICARE

## 2024-12-17 DIAGNOSIS — I42.9 CARDIOMYOPATHY, UNSPECIFIED TYPE (MULTI): ICD-10-CM

## 2024-12-17 RX ORDER — CARVEDILOL 3.12 MG/1
3.12 TABLET ORAL
Qty: 180 TABLET | Refills: 3 | Status: SHIPPED | OUTPATIENT
Start: 2024-12-17

## 2024-12-30 DIAGNOSIS — J47.0 BRONCHIECTASIS WITH ACUTE LOWER RESPIRATORY INFECTION (MULTI): ICD-10-CM

## 2024-12-31 RX ORDER — ALBUTEROL SULFATE 0.83 MG/ML
2.5 SOLUTION RESPIRATORY (INHALATION) 2 TIMES DAILY
Qty: 540 ML | Refills: 3 | Status: SHIPPED | OUTPATIENT
Start: 2024-12-31

## 2025-01-23 DIAGNOSIS — E03.9 ACQUIRED HYPOTHYROIDISM: ICD-10-CM

## 2025-01-23 RX ORDER — LEVOTHYROXINE SODIUM 50 UG/1
TABLET ORAL
Qty: 35 TABLET | Refills: 0 | Status: SHIPPED | OUTPATIENT
Start: 2025-01-23

## 2025-01-24 ENCOUNTER — APPOINTMENT (OUTPATIENT)
Dept: PRIMARY CARE | Facility: CLINIC | Age: 85
End: 2025-01-24
Payer: MEDICARE

## 2025-01-30 ENCOUNTER — OFFICE VISIT (OUTPATIENT)
Dept: PRIMARY CARE | Facility: CLINIC | Age: 85
End: 2025-01-30
Payer: MEDICARE

## 2025-01-30 VITALS
DIASTOLIC BLOOD PRESSURE: 82 MMHG | SYSTOLIC BLOOD PRESSURE: 146 MMHG | WEIGHT: 148 LBS | TEMPERATURE: 97.4 F | HEIGHT: 72 IN | BODY MASS INDEX: 20.05 KG/M2 | OXYGEN SATURATION: 99 % | HEART RATE: 82 BPM

## 2025-01-30 DIAGNOSIS — D75.839 THROMBOCYTOSIS: ICD-10-CM

## 2025-01-30 DIAGNOSIS — D64.9 NORMOCHROMIC NORMOCYTIC ANEMIA: ICD-10-CM

## 2025-01-30 DIAGNOSIS — R22.2 PLEURAL NODULES: ICD-10-CM

## 2025-01-30 DIAGNOSIS — I42.9 CARDIOMYOPATHY, UNSPECIFIED TYPE (MULTI): ICD-10-CM

## 2025-01-30 DIAGNOSIS — E03.9 ACQUIRED HYPOTHYROIDISM: ICD-10-CM

## 2025-01-30 DIAGNOSIS — J47.0 BRONCHIECTASIS WITH ACUTE LOWER RESPIRATORY INFECTION: ICD-10-CM

## 2025-01-30 DIAGNOSIS — Z00.00 ENCOUNTER FOR MEDICARE ANNUAL WELLNESS EXAM: Primary | ICD-10-CM

## 2025-01-30 DIAGNOSIS — J47.0 BRONCHIECTASIS WITH ACUTE LOWER RESPIRATORY INFECTION (MULTI): ICD-10-CM

## 2025-01-30 DIAGNOSIS — R63.4 ABNORMAL WEIGHT LOSS: ICD-10-CM

## 2025-01-30 DIAGNOSIS — I49.9 VENTRICULAR ARRHYTHMIA: ICD-10-CM

## 2025-01-30 DIAGNOSIS — I47.10 SUPRAVENTRICULAR TACHYCARDIA: ICD-10-CM

## 2025-01-30 PROBLEM — T14.8XXA LOCAL INFECTION OF WOUND: Status: RESOLVED | Noted: 2024-05-14 | Resolved: 2025-01-30

## 2025-01-30 PROBLEM — S81.819A LACERATION OF LOWER EXTREMITY: Status: RESOLVED | Noted: 2024-05-14 | Resolved: 2025-01-30

## 2025-01-30 PROBLEM — L08.9 LOCAL INFECTION OF WOUND: Status: RESOLVED | Noted: 2024-05-14 | Resolved: 2025-01-30

## 2025-01-30 PROCEDURE — 1159F MED LIST DOCD IN RCRD: CPT | Performed by: INTERNAL MEDICINE

## 2025-01-30 PROCEDURE — 1126F AMNT PAIN NOTED NONE PRSNT: CPT | Performed by: INTERNAL MEDICINE

## 2025-01-30 PROCEDURE — 3077F SYST BP >= 140 MM HG: CPT | Performed by: INTERNAL MEDICINE

## 2025-01-30 PROCEDURE — G0439 PPPS, SUBSEQ VISIT: HCPCS | Performed by: INTERNAL MEDICINE

## 2025-01-30 PROCEDURE — 3079F DIAST BP 80-89 MM HG: CPT | Performed by: INTERNAL MEDICINE

## 2025-01-30 PROCEDURE — 99215 OFFICE O/P EST HI 40 MIN: CPT | Performed by: INTERNAL MEDICINE

## 2025-01-30 PROCEDURE — 1124F ACP DISCUSS-NO DSCNMKR DOCD: CPT | Performed by: INTERNAL MEDICINE

## 2025-01-30 RX ORDER — LEVOTHYROXINE SODIUM 50 UG/1
TABLET ORAL
Qty: 35 TABLET | Refills: 0 | Status: SHIPPED | OUTPATIENT
Start: 2025-01-30 | End: 2025-01-30

## 2025-01-30 RX ORDER — LEVOTHYROXINE SODIUM 50 UG/1
TABLET ORAL
Qty: 125 TABLET | Refills: 2 | Status: SHIPPED | OUTPATIENT
Start: 2025-01-30 | End: 2025-03-11

## 2025-01-30 ASSESSMENT — PAIN SCALES - GENERAL: PAINLEVEL_OUTOF10: 0-NO PAIN

## 2025-01-30 ASSESSMENT — PATIENT HEALTH QUESTIONNAIRE - PHQ9
SUM OF ALL RESPONSES TO PHQ9 QUESTIONS 1 AND 2: 0
SUM OF ALL RESPONSES TO PHQ9 QUESTIONS 1 AND 2: 0
2. FEELING DOWN, DEPRESSED OR HOPELESS: NOT AT ALL
2. FEELING DOWN, DEPRESSED OR HOPELESS: NOT AT ALL
1. LITTLE INTEREST OR PLEASURE IN DOING THINGS: NOT AT ALL
1. LITTLE INTEREST OR PLEASURE IN DOING THINGS: NOT AT ALL

## 2025-01-30 ASSESSMENT — ENCOUNTER SYMPTOMS
OCCASIONAL FEELINGS OF UNSTEADINESS: 1
LOSS OF SENSATION IN FEET: 0
DEPRESSION: 0

## 2025-01-30 NOTE — PROGRESS NOTES
[facility]: [facility]  MEDICARE WELLNESS EXAM      Héctor Mariano is a 84 y.o. male that is presenting today for Annual Exam (CPE FBW).    Assessment/Plan    {Assess/Plan SmartLinks (Optional):28941}  ADVANCED CARE PLANNING  Advanced Care Planning was discussed with patient:  The patient does not have an advanced care plan on file. The patient does not have an active surrogate decision-maker on file.  Encouraged the patient to confirm that Living Will and Healthcare Power of  (HCPoA) are accurate and up to date.  Encouraged the patient to confirm that our office be provided a copy of any documentation in the event that anything changes.    ACTIVITIES OF DAILY LIVING  Basic ADLs:  Bathing: Independent, Dressing: Independent, Toileting: Independent, Transferring: Independent, Continence: Independent, Feeding: Independent.    Instrumental ADLs:  Ability to use phone: Independent, Shopping: Independent, Cooking: Independent, House-keeping: Independent, Laundry: Independent, Transportation: Independent, Medication Management: Independent, Finance Management: Independent.    Subjective   HPI    - Héctor Mariano 84 y.o. 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  (11 lbs since 6/2024)       - Patient denies any other symptoms or concerns at this time.       - patient denies any adverse reactions to or concerns with his/her meds.       - Problem list and medication reconciliation done today.  - V.S. Stable. No changes at this time.  - Encouraged continued diet and exercise modification.     Review of Systems  All pertinent POSITIVES as noted per HPI.  All other systems have been reviewed and are NEGATIVE and /or Noncontributory to this patient current visit or complaint.    Objective   Vitals:     "01/30/25 1145   BP: 146/82   Pulse: 82   Temp: 36.3 °C (97.4 °F)   SpO2: 99%      Body mass index is 20.07 kg/m².  Physical Exam  Diagnostic Results   Lab Results   Component Value Date    GLUCOSE 97 11/26/2024    CALCIUM 8.9 11/26/2024     (L) 11/26/2024    K 4.5 11/26/2024    CO2 30 11/26/2024    CL 97 (L) 11/26/2024    BUN 15 11/26/2024    CREATININE 0.78 11/26/2024     Lab Results   Component Value Date    ALT 11 11/26/2024    AST 15 11/26/2024    ALKPHOS 78 11/26/2024    BILITOT 0.4 11/26/2024     Lab Results   Component Value Date    WBC 11.4 (H) 11/26/2024    HGB 11.7 (L) 11/26/2024    HCT 36.3 (L) 11/26/2024    MCV 87 11/26/2024     (H) 11/26/2024     Lab Results   Component Value Date    CHOL 164 11/26/2024    CHOL 220 (H) 11/21/2023    CHOL 173 10/05/2022     Lab Results   Component Value Date    HDL 49.7 11/26/2024    HDL 56.0 11/21/2023    HDL 39 (L) 10/05/2022     Lab Results   Component Value Date    LDLCALC 100 (H) 11/26/2024    LDLCALC 141 (H) 11/21/2023    LDLCALC 113 10/05/2022     Lab Results   Component Value Date    TRIG 71 11/26/2024    TRIG 113 11/21/2023    TRIG 104 10/05/2022     No components found for: \"CHOLHDL\"  Lab Results   Component Value Date    HGBA1C 5.6 11/26/2024     Other labs not included in the list above reviewed either before or during this encounter.    History   Past Medical History:   Diagnosis Date    Abnormal ECG     Arrhythmia      History reviewed. No pertinent surgical history.  Family History   Problem Relation Name Age of Onset    Heart disease Father Gurvinder Mariano     Ovarian cancer Paternal Grandmother      Diabetes Paternal Grandfather Gurvinder Mariano     Prostate cancer Paternal Grandfather Gruvinder Mariano     Diabetes type I Paternal Grandfather Gurvinder García      Social History     Socioeconomic History    Marital status:      Spouse name: Not on file    Number of children: Not on file    Years of education: Not on file    Highest education " level: Not on file   Occupational History    Not on file   Tobacco Use    Smoking status: Former     Current packs/day: 0.00     Average packs/day: 1 pack/day for 15.0 years (15.0 ttl pk-yrs)     Types: Cigarettes     Quit date:      Years since quittin.1     Passive exposure: Past    Smokeless tobacco: Never    Tobacco comments:     Pt quit about 55 years ago   Vaping Use    Vaping status: Never Used   Substance and Sexual Activity    Alcohol use: Not Currently     Comment: very rarely    Drug use: Never    Sexual activity: Not on file   Other Topics Concern    Not on file   Social History Narrative    Not on file     Social Drivers of Health     Financial Resource Strain: Not on file   Food Insecurity: Not on file   Transportation Needs: Not on file   Physical Activity: Not on file   Stress: Not on file   Social Connections: Not on file   Intimate Partner Violence: Not on file   Housing Stability: Not on file     Allergies   Allergen Reactions    Bee Venom Protein (Honey Bee) Swelling     Current Outpatient Medications on File Prior to Visit   Medication Sig Dispense Refill    albuterol 2.5 mg /3 mL (0.083 %) nebulizer solution Take 3 mL (2.5 mg) by nebulization 2 times a day. 540 mL 3    calcium carbonate (TUMS ORAL) Tums      carvedilol (Coreg) 3.125 mg tablet TAKE 1 TABLET BY MOUTH TWICE DAILY WITH FOOD 180 tablet 3    cholecalciferol (Vitamin D-3) 50 MCG (2000 UT) tablet Take 1 tablet (2,000 Units) by mouth once daily.      EPINEPHrine (Epipen) 0.3 mg/0.3 mL injection syringe Inject 0.3 mL (0.3 mg) into the muscle 1 time if needed for anaphylaxis for up to 1 dose. Inject into upper leg. Call 911 after use. 0.3 mL 0    levothyroxine (Synthroid, Levoxyl) 50 mcg tablet TAKE 1 TABLET EVERY MORNING WITH 2 TABLETS ON  ON AN EMPTY STOMACH. TAKE ORALLY ONCE A DAY FOR 90 DAYS. 35 tablet 0    MULTIVITAMIN ORAL as directed Orally      psyllium husk (METAMUCIL ORAL) Metamucil      sodium chloride (Saline  NasaL) 0.65 % nasal spray Administer 2 sprays into each nostril every 2 hours if needed.       No current facility-administered medications on file prior to visit.     Immunization History   Administered Date(s) Administered    COVID-19, mRNA, LNP-S, PF, 30 mcg/0.3 mL dose 01/20/2021, 02/11/2021, 09/25/2021    Flu vaccine, quadrivalent, high-dose, preservative free, age 65y+ (FLUZONE) 10/06/2020, 09/20/2021, 10/04/2022, 10/04/2023    Flu vaccine, trivalent, preservative free, HIGH-DOSE, age 65y+ (Fluzone) 10/06/2016, 10/12/2017, 10/25/2018, 10/15/2019, 09/27/2024    Influenza, seasonal, injectable 09/30/2010, 09/29/2011, 09/27/2012, 10/03/2013, 10/09/2014, 10/01/2015    Moderna COVID-19 vaccine, 12 years and older (50mcg/0.5mL)(Spikevax) 09/27/2024    Novel influenza-H1N1-09, preservative-free 12/13/2009    Pfizer COVID-19 vaccine, bivalent, age 12 years and older (30 mcg/0.3 mL) 10/14/2022    Pfizer Gray Cap SARS-CoV-2 04/17/2022    Pneumococcal conjugate vaccine, 13-valent (PREVNAR 13) 09/29/2007, 10/11/2012, 11/01/2016    Pneumococcal polysaccharide vaccine, 23-valent, age 2 years and older (PNEUMOVAX 23) 10/11/2012, 11/12/2020    Td (adult) 10/10/2022    Tdap vaccine, age 7 year and older (BOOSTRIX, ADACEL) 10/10/2022    Zoster, live 08/23/2016     Patient's medical history was reviewed and updated either before or during this encounter.     Kiran John MD   time if needed for anaphylaxis for up to 1 dose. Inject into upper leg. Call 911 after use. 0.3 mL 0    levothyroxine (Synthroid, Levoxyl) 50 mcg tablet TAKE 1 TABLET EVERY MORNING WITH 2 TABLETS ON SUNDAY ON AN EMPTY STOMACH. TAKE ORALLY ONCE A DAY FOR 90 DAYS. 35 tablet 0    MULTIVITAMIN ORAL as directed Orally      psyllium husk (METAMUCIL ORAL) Metamucil      sodium chloride (Saline NasaL) 0.65 % nasal spray Administer 2 sprays into each nostril every 2 hours if needed.       No current facility-administered medications on file prior to visit.     Immunization History   Administered Date(s) Administered    COVID-19, mRNA, LNP-S, PF, 30 mcg/0.3 mL dose 01/20/2021, 02/11/2021, 09/25/2021    Flu vaccine, quadrivalent, high-dose, preservative free, age 65y+ (FLUZONE) 10/06/2020, 09/20/2021, 10/04/2022, 10/04/2023    Flu vaccine, trivalent, preservative free, HIGH-DOSE, age 65y+ (Fluzone) 10/06/2016, 10/12/2017, 10/25/2018, 10/15/2019, 09/27/2024    Influenza, seasonal, injectable 09/30/2010, 09/29/2011, 09/27/2012, 10/03/2013, 10/09/2014, 10/01/2015    Moderna COVID-19 vaccine, 12 years and older (50mcg/0.5mL)(Spikevax) 09/27/2024    Novel influenza-H1N1-09, preservative-free 12/13/2009    Pfizer COVID-19 vaccine, bivalent, age 12 years and older (30 mcg/0.3 mL) 10/14/2022    Pfizer Gray Cap SARS-CoV-2 04/17/2022    Pneumococcal conjugate vaccine, 13-valent (PREVNAR 13) 09/29/2007, 10/11/2012, 11/01/2016    Pneumococcal polysaccharide vaccine, 23-valent, age 2 years and older (PNEUMOVAX 23) 10/11/2012, 11/12/2020    Td (adult) 10/10/2022    Tdap vaccine, age 7 year and older (BOOSTRIX, ADACEL) 10/10/2022    Zoster, live 08/23/2016     Patient's medical history was reviewed and updated either before or during this encounter.     Kiran John MD

## 2025-02-03 ENCOUNTER — HOSPITAL ENCOUNTER (OUTPATIENT)
Dept: RADIOLOGY | Facility: CLINIC | Age: 85
Discharge: HOME | End: 2025-02-03
Payer: MEDICARE

## 2025-02-03 DIAGNOSIS — R63.4 ABNORMAL WEIGHT LOSS: ICD-10-CM

## 2025-02-03 DIAGNOSIS — R22.2 PLEURAL NODULES: ICD-10-CM

## 2025-02-03 PROCEDURE — 71250 CT THORAX DX C-: CPT | Performed by: RADIOLOGY

## 2025-02-03 PROCEDURE — 71250 CT THORAX DX C-: CPT

## 2025-03-03 ENCOUNTER — LAB (OUTPATIENT)
Dept: LAB | Facility: CLINIC | Age: 85
End: 2025-03-03
Payer: MEDICARE

## 2025-03-03 DIAGNOSIS — D64.9 ANEMIA, UNSPECIFIED TYPE: ICD-10-CM

## 2025-03-03 LAB
ALBUMIN SERPL BCP-MCNC: 3.8 G/DL (ref 3.4–5)
ALP SERPL-CCNC: 90 U/L (ref 33–136)
ALT SERPL W P-5'-P-CCNC: 12 U/L (ref 10–52)
ANION GAP SERPL CALC-SCNC: 11 MMOL/L (ref 10–20)
AST SERPL W P-5'-P-CCNC: 15 U/L (ref 9–39)
BASOPHILS # BLD AUTO: 0.04 X10*3/UL (ref 0–0.1)
BASOPHILS NFR BLD AUTO: 0.5 %
BILIRUB SERPL-MCNC: 0.4 MG/DL (ref 0–1.2)
BUN SERPL-MCNC: 14 MG/DL (ref 6–23)
CALCIUM SERPL-MCNC: 9.2 MG/DL (ref 8.6–10.3)
CHLORIDE SERPL-SCNC: 98 MMOL/L (ref 98–107)
CO2 SERPL-SCNC: 29 MMOL/L (ref 21–32)
CREAT SERPL-MCNC: 0.81 MG/DL (ref 0.5–1.3)
EGFRCR SERPLBLD CKD-EPI 2021: 86 ML/MIN/1.73M*2
EOSINOPHIL # BLD AUTO: 0.08 X10*3/UL (ref 0–0.4)
EOSINOPHIL NFR BLD AUTO: 1 %
ERYTHROCYTE [DISTWIDTH] IN BLOOD BY AUTOMATED COUNT: 14.2 % (ref 11.5–14.5)
FERRITIN SERPL-MCNC: 220 NG/ML (ref 20–300)
FOLATE SERPL-MCNC: 20.6 NG/ML
GLUCOSE SERPL-MCNC: 155 MG/DL (ref 74–99)
HCT VFR BLD AUTO: 37.2 % (ref 41–52)
HGB BLD-MCNC: 11.8 G/DL (ref 13.5–17.5)
IMM GRANULOCYTES # BLD AUTO: 0.03 X10*3/UL (ref 0–0.5)
IMM GRANULOCYTES NFR BLD AUTO: 0.4 % (ref 0–0.9)
IRON SATN MFR SERPL: 22 % (ref 25–45)
IRON SERPL-MCNC: 55 UG/DL (ref 35–150)
LYMPHOCYTES # BLD AUTO: 2 X10*3/UL (ref 0.8–3)
LYMPHOCYTES NFR BLD AUTO: 25.7 %
MCH RBC QN AUTO: 27.9 PG (ref 26–34)
MCHC RBC AUTO-ENTMCNC: 31.7 G/DL (ref 32–36)
MCV RBC AUTO: 88 FL (ref 80–100)
MONOCYTES # BLD AUTO: 0.46 X10*3/UL (ref 0.05–0.8)
MONOCYTES NFR BLD AUTO: 5.9 %
NEUTROPHILS # BLD AUTO: 5.18 X10*3/UL (ref 1.6–5.5)
NEUTROPHILS NFR BLD AUTO: 66.5 %
NRBC BLD-RTO: 0 /100 WBCS (ref 0–0)
PLATELET # BLD AUTO: 372 X10*3/UL (ref 150–450)
POTASSIUM SERPL-SCNC: 4.4 MMOL/L (ref 3.5–5.3)
PROT SERPL-MCNC: 7.2 G/DL (ref 6.4–8.2)
RBC # BLD AUTO: 4.23 X10*6/UL (ref 4.5–5.9)
SODIUM SERPL-SCNC: 134 MMOL/L (ref 136–145)
TIBC SERPL-MCNC: 249 UG/DL (ref 240–445)
UIBC SERPL-MCNC: 194 UG/DL (ref 110–370)
VIT B12 SERPL-MCNC: 785 PG/ML (ref 211–911)
WBC # BLD AUTO: 7.8 X10*3/UL (ref 4.4–11.3)

## 2025-03-03 PROCEDURE — 82728 ASSAY OF FERRITIN: CPT

## 2025-03-03 PROCEDURE — 83540 ASSAY OF IRON: CPT

## 2025-03-03 PROCEDURE — 82668 ASSAY OF ERYTHROPOIETIN: CPT

## 2025-03-03 PROCEDURE — 82607 VITAMIN B-12: CPT

## 2025-03-03 PROCEDURE — 85025 COMPLETE CBC W/AUTO DIFF WBC: CPT

## 2025-03-03 PROCEDURE — 80053 COMPREHEN METABOLIC PANEL: CPT

## 2025-03-03 PROCEDURE — 36415 COLL VENOUS BLD VENIPUNCTURE: CPT

## 2025-03-03 PROCEDURE — 82746 ASSAY OF FOLIC ACID SERUM: CPT

## 2025-03-04 LAB — EPO SERPL-ACNC: 6 MU/ML (ref 4–27)

## 2025-03-11 ENCOUNTER — TELEPHONE (OUTPATIENT)
Dept: HEMATOLOGY/ONCOLOGY | Facility: CLINIC | Age: 85
End: 2025-03-11
Payer: MEDICARE

## 2025-03-11 LAB
ELECTRONICALLY SIGNED BY: NORMAL
MYELOID NGS RESULTS: NORMAL

## 2025-03-11 NOTE — TELEPHONE ENCOUNTER
Spoke at length with Mrs Mariano.  Her   overnight on Monday evening.  He  had a NP Benign Heme appt with Dr. Matthew Dx Normochromic normocytic anemia and thrombocytosis scheduled for tomorrow.  Wife wants Dr. Matthew to tell her how and why he .  Explained that Héctor was not seen by Dr. Matthew yet and she would not be able to answer that question.  She should discuss her concerns with her husbands PCP or the .  She did a teachback.  Dr. Matthew notified.

## 2025-03-12 ENCOUNTER — APPOINTMENT (OUTPATIENT)
Dept: HEMATOLOGY/ONCOLOGY | Facility: CLINIC | Age: 85
End: 2025-03-12
Payer: MEDICARE

## 2025-03-12 DIAGNOSIS — D64.9 ANEMIA, UNSPECIFIED TYPE: Primary | ICD-10-CM

## 2025-04-02 PROBLEM — R22.2 PLEURAL NODULES: Status: ACTIVE | Noted: 2025-04-02

## 2025-04-02 PROBLEM — R63.4 ABNORMAL WEIGHT LOSS: Status: ACTIVE | Noted: 2025-04-02

## 2025-06-24 ENCOUNTER — APPOINTMENT (OUTPATIENT)
Facility: CLINIC | Age: 85
End: 2025-06-24
Payer: MEDICARE

## 2025-07-31 ENCOUNTER — APPOINTMENT (OUTPATIENT)
Dept: PRIMARY CARE | Facility: CLINIC | Age: 85
End: 2025-07-31
Payer: MEDICARE